# Patient Record
Sex: FEMALE | Race: WHITE | Employment: UNEMPLOYED | RURAL
[De-identification: names, ages, dates, MRNs, and addresses within clinical notes are randomized per-mention and may not be internally consistent; named-entity substitution may affect disease eponyms.]

---

## 2017-01-05 RX ORDER — TRAZODONE HYDROCHLORIDE 100 MG/1
100 TABLET ORAL NIGHTLY
Qty: 30 TABLET | Refills: 3 | Status: SHIPPED | OUTPATIENT
Start: 2017-01-05 | End: 2017-05-15 | Stop reason: SDUPTHER

## 2017-01-05 RX ORDER — DOXEPIN HYDROCHLORIDE 25 MG/1
25 CAPSULE ORAL NIGHTLY
Qty: 30 CAPSULE | Refills: 3 | Status: SHIPPED | OUTPATIENT
Start: 2017-01-05 | End: 2017-03-07 | Stop reason: SDUPTHER

## 2017-01-05 RX ORDER — DIPHENHYDRAMINE HCL 25 MG
CAPSULE ORAL
Qty: 30 CAPSULE | Refills: 0 | Status: SHIPPED | OUTPATIENT
Start: 2017-01-05 | End: 2017-02-15 | Stop reason: SDUPTHER

## 2017-01-05 RX ORDER — ASPIRIN 325 MG
325 TABLET ORAL DAILY
Qty: 30 TABLET | Refills: 11 | Status: SHIPPED | OUTPATIENT
Start: 2017-01-05 | End: 2018-01-24 | Stop reason: SDUPTHER

## 2017-01-05 RX ORDER — CARISOPRODOL 350 MG/1
TABLET ORAL
Qty: 90 TABLET | Refills: 0 | Status: SHIPPED | OUTPATIENT
Start: 2017-01-05 | End: 2017-02-03 | Stop reason: SDUPTHER

## 2017-01-17 ENCOUNTER — OFFICE VISIT (OUTPATIENT)
Dept: PRIMARY CARE CLINIC | Age: 57
End: 2017-01-17
Payer: MEDICAID

## 2017-01-17 VITALS
DIASTOLIC BLOOD PRESSURE: 68 MMHG | SYSTOLIC BLOOD PRESSURE: 104 MMHG | BODY MASS INDEX: 25.88 KG/M2 | HEART RATE: 72 BPM | RESPIRATION RATE: 18 BRPM | OXYGEN SATURATION: 97 % | WEIGHT: 170.2 LBS

## 2017-01-17 DIAGNOSIS — I10 ESSENTIAL HYPERTENSION: Primary | ICD-10-CM

## 2017-01-17 DIAGNOSIS — F41.9 ANXIETY: ICD-10-CM

## 2017-01-17 DIAGNOSIS — E53.8 FOLIC ACID DEFICIENCY: ICD-10-CM

## 2017-01-17 DIAGNOSIS — E78.5 HYPERLIPIDEMIA, UNSPECIFIED HYPERLIPIDEMIA TYPE: ICD-10-CM

## 2017-01-17 PROCEDURE — 99213 OFFICE O/P EST LOW 20 MIN: CPT | Performed by: INTERNAL MEDICINE

## 2017-01-17 RX ORDER — CLONAZEPAM 1 MG/1
1 TABLET ORAL NIGHTLY PRN
Qty: 30 TABLET | Refills: 0 | Status: SHIPPED | OUTPATIENT
Start: 2017-01-17 | End: 2017-02-14 | Stop reason: SDUPTHER

## 2017-01-17 ASSESSMENT — ENCOUNTER SYMPTOMS
SINUS PRESSURE: 0
NAUSEA: 0
VOMITING: 0
ABDOMINAL PAIN: 0
COUGH: 0
BACK PAIN: 0
EYE DISCHARGE: 0
SORE THROAT: 0
SHORTNESS OF BREATH: 0
WHEEZING: 0

## 2017-01-24 RX ORDER — ZOLPIDEM TARTRATE 5 MG/1
TABLET ORAL
Qty: 30 TABLET | Refills: 0 | Status: SHIPPED | OUTPATIENT
Start: 2017-01-24 | End: 2017-02-23 | Stop reason: SDUPTHER

## 2017-01-25 RX ORDER — ROSUVASTATIN CALCIUM 20 MG/1
TABLET, COATED ORAL
Qty: 30 TABLET | Refills: 0 | Status: SHIPPED | OUTPATIENT
Start: 2017-01-25 | End: 2017-03-21 | Stop reason: SDUPTHER

## 2017-01-25 RX ORDER — CLONIDINE HYDROCHLORIDE 0.1 MG/1
TABLET ORAL
Qty: 60 TABLET | Refills: 0 | Status: SHIPPED | OUTPATIENT
Start: 2017-01-25 | End: 2017-03-21 | Stop reason: SDUPTHER

## 2017-01-25 RX ORDER — ATENOLOL 50 MG/1
TABLET ORAL
Qty: 30 TABLET | Refills: 0 | Status: SHIPPED | OUTPATIENT
Start: 2017-01-25 | End: 2017-03-21 | Stop reason: SDUPTHER

## 2017-01-25 RX ORDER — LISINOPRIL 40 MG/1
TABLET ORAL
Qty: 30 TABLET | Refills: 0 | Status: SHIPPED | OUTPATIENT
Start: 2017-01-25 | End: 2017-03-16 | Stop reason: ALTCHOICE

## 2017-01-26 ENCOUNTER — TELEPHONE (OUTPATIENT)
Dept: PRIMARY CARE CLINIC | Age: 57
End: 2017-01-26

## 2017-02-03 RX ORDER — CARISOPRODOL 350 MG/1
350 TABLET ORAL 3 TIMES DAILY PRN
Qty: 90 TABLET | Refills: 0 | Status: SHIPPED | OUTPATIENT
Start: 2017-02-03 | End: 2017-03-16 | Stop reason: ALTCHOICE

## 2017-02-06 ENCOUNTER — HOSPITAL ENCOUNTER (OUTPATIENT)
Dept: OTHER | Age: 57
Discharge: HOME | End: 2017-02-06
Attending: INTERNAL MEDICINE | Admitting: INTERNAL MEDICINE

## 2017-02-06 ENCOUNTER — OFFICE VISIT (OUTPATIENT)
Dept: NEUROLOGY | Facility: CLINIC | Age: 57
End: 2017-02-06

## 2017-02-06 VITALS
HEART RATE: 73 BPM | BODY MASS INDEX: 25.76 KG/M2 | WEIGHT: 170 LBS | HEIGHT: 68 IN | SYSTOLIC BLOOD PRESSURE: 93 MMHG | DIASTOLIC BLOOD PRESSURE: 65 MMHG

## 2017-02-06 DIAGNOSIS — M79.7 FIBROMYALGIA: ICD-10-CM

## 2017-02-06 DIAGNOSIS — I10 ESSENTIAL HYPERTENSION: ICD-10-CM

## 2017-02-06 DIAGNOSIS — M54.2 CHRONIC NECK PAIN: ICD-10-CM

## 2017-02-06 DIAGNOSIS — I77.9 BILATERAL CAROTID ARTERY DISEASE (HCC): ICD-10-CM

## 2017-02-06 DIAGNOSIS — G89.29 CHRONIC NECK PAIN: ICD-10-CM

## 2017-02-06 DIAGNOSIS — F41.9 ANXIETY AND DEPRESSION: ICD-10-CM

## 2017-02-06 DIAGNOSIS — E78.5 HYPERLIPIDEMIA, UNSPECIFIED HYPERLIPIDEMIA TYPE: ICD-10-CM

## 2017-02-06 DIAGNOSIS — F32.A ANXIETY AND DEPRESSION: ICD-10-CM

## 2017-02-06 DIAGNOSIS — F41.9 ANXIETY: ICD-10-CM

## 2017-02-06 DIAGNOSIS — I69.30 SEQUELAE, POST-STROKE: Primary | ICD-10-CM

## 2017-02-06 DIAGNOSIS — F51.04 CHRONIC INSOMNIA: ICD-10-CM

## 2017-02-06 DIAGNOSIS — L29.9 PRURITUS: ICD-10-CM

## 2017-02-06 DIAGNOSIS — M47.812 SPONDYLOSIS OF CERVICAL REGION WITHOUT MYELOPATHY OR RADICULOPATHY: ICD-10-CM

## 2017-02-06 LAB
A/G RATIO: 2 (ref 0.8–2)
ALBUMIN SERPL-MCNC: 4.7 G/DL (ref 3.4–4.8)
ALP BLD-CCNC: 152 U/L (ref 25–100)
ALT SERPL-CCNC: 14 U/L (ref 4–36)
ANION GAP SERPL CALCULATED.3IONS-SCNC: 13 MMOL/L (ref 3–16)
AST SERPL-CCNC: 18 U/L (ref 8–33)
BASOPHILS ABSOLUTE: 0.1 K/UL (ref 0–0.1)
BASOPHILS RELATIVE PERCENT: 0.7 %
BILIRUB SERPL-MCNC: 0.3 MG/DL (ref 0.3–1.2)
BUN BLDV-MCNC: 16 MG/DL (ref 6–20)
CALCIUM SERPL-MCNC: 10.4 MG/DL (ref 8.5–10.5)
CHLORIDE BLD-SCNC: 104 MMOL/L (ref 98–107)
CO2: 25 MMOL/L (ref 20–30)
CREAT SERPL-MCNC: 1.6 MG/DL (ref 0.4–1.2)
EOSINOPHILS ABSOLUTE: 0.1 K/UL (ref 0–0.4)
EOSINOPHILS RELATIVE PERCENT: 1.3 %
GFR AFRICAN AMERICAN: 41
GFR NON-AFRICAN AMERICAN: 33
GLOBULIN: 2.4 G/DL
GLUCOSE BLD-MCNC: 102 MG/DL (ref 74–106)
HCT VFR BLD CALC: 44.3 % (ref 37–47)
HEMOGLOBIN: 14 G/DL (ref 11.5–16.5)
LYMPHOCYTES ABSOLUTE: 2.7 K/UL (ref 1.5–4)
LYMPHOCYTES RELATIVE PERCENT: 31.4 % (ref 20–40)
MCH RBC QN AUTO: 29.6 PG (ref 27–32)
MCHC RBC AUTO-ENTMCNC: 31.6 G/DL (ref 31–35)
MCV RBC AUTO: 93.7 FL (ref 80–100)
MONOCYTES ABSOLUTE: 0.7 K/UL (ref 0.2–0.8)
MONOCYTES RELATIVE PERCENT: 8.1 % (ref 3–10)
NEUTROPHILS ABSOLUTE: 5 K/UL (ref 2–7.5)
NEUTROPHILS RELATIVE PERCENT: 58.5 %
PDW BLD-RTO: 14.4 % (ref 11–16)
PLATELET # BLD: 221 K/UL (ref 150–400)
PMV BLD AUTO: 12 FL (ref 6–10)
POTASSIUM SERPL-SCNC: 4.8 MMOL/L (ref 3.4–5.1)
RBC # BLD: 4.73 M/UL (ref 3.8–5.8)
SODIUM BLD-SCNC: 142 MMOL/L (ref 136–145)
TOTAL PROTEIN: 7.1 G/DL (ref 6.4–8.3)
WBC # BLD: 8.6 K/UL (ref 4–11)

## 2017-02-06 PROCEDURE — 99213 OFFICE O/P EST LOW 20 MIN: CPT | Performed by: PSYCHIATRY & NEUROLOGY

## 2017-02-06 RX ORDER — DOXEPIN HYDROCHLORIDE 50 MG/1
50 CAPSULE ORAL NIGHTLY
Qty: 30 CAPSULE | Refills: 11 | Status: SHIPPED | OUTPATIENT
Start: 2017-02-06 | End: 2020-03-11

## 2017-02-06 NOTE — PROGRESS NOTES
Subjective:     Patient ID: Lexii Duckworth is a 56 y.o. female.    History of Present Illness  The following portions of the patient's history were reviewed and updated as appropriate: allergies, current medications, past family history, past medical history, past social history, past surgical history and problem list.  Complains of poor sleep, feeling down, denies any stroke symptoms, gets Soma, Klonopin from primary office.  Review of Systems   Constitutional: Positive for fatigue. Negative for chills, fever and unexpected weight change.   HENT: Positive for sore throat. Negative for ear pain, hearing loss, nosebleeds and rhinorrhea.    Eyes: Positive for pain and itching. Negative for photophobia, discharge and visual disturbance.   Respiratory: Positive for cough and shortness of breath. Negative for chest tightness and wheezing.    Cardiovascular: Negative for chest pain, palpitations and leg swelling.   Gastrointestinal: Positive for diarrhea, nausea and vomiting. Negative for abdominal pain, blood in stool and constipation.   Genitourinary: Negative for dysuria, frequency, hematuria and urgency.   Musculoskeletal: Positive for gait problem and joint swelling. Negative for arthralgias, back pain, myalgias, neck pain and neck stiffness.        LIMB SWELLING, LIMB PAIN, JOINT STIFFNESS   Skin: Negative for rash and wound.   Allergic/Immunologic: Negative for environmental allergies and food allergies.   Neurological: Positive for dizziness, syncope, weakness, numbness and headaches. Negative for tremors, seizures, speech difficulty and light-headedness.   Hematological: Negative for adenopathy. Bruises/bleeds easily.   Psychiatric/Behavioral: Positive for confusion, dysphoric mood and sleep disturbance. Negative for agitation, decreased concentration, hallucinations and suicidal ideas. The patient is nervous/anxious.         Objective:    Neurologic Exam     Mental Status   Oriented to person, place, and time.        Physical Exam   Constitutional: She is oriented to person, place, and time. She appears well-developed and well-nourished.   Cardiovascular: Normal rate and regular rhythm.    Pulmonary/Chest: Effort normal.   Neurological: She is alert and oriented to person, place, and time. She has normal reflexes.   Psychiatric: She has a normal mood and affect. Her behavior is normal. Thought content normal.       Assessment/Plan:     Lexii was seen today for difficulty walking.    Diagnoses and all orders for this visit:    Sequelae, post-stroke    Bilateral carotid artery disease    Chronic neck pain  -     doxepin (SINEquan) 50 MG capsule; Take 1 capsule by mouth Every Night.    Spondylosis of cervical region without myelopathy or radiculopathy    Fibromyalgia  -     doxepin (SINEquan) 50 MG capsule; Take 1 capsule by mouth Every Night.    Anxiety and depression  -     doxepin (SINEquan) 50 MG capsule; Take 1 capsule by mouth Every Night.    Chronic insomnia  -     doxepin (SINEquan) 50 MG capsule; Take 1 capsule by mouth Every Night.    Anxiety  -     doxepin (SINEquan) 50 MG capsule; Take 1 capsule by mouth Every Night.    Pruritus  -     doxepin (SINEquan) 50 MG capsule; Take 1 capsule by mouth Every Night.

## 2017-02-14 RX ORDER — CLONAZEPAM 1 MG/1
1 TABLET ORAL NIGHTLY PRN
Qty: 30 TABLET | Refills: 0 | Status: SHIPPED | OUTPATIENT
Start: 2017-02-14 | End: 2017-03-13 | Stop reason: SDUPTHER

## 2017-02-15 RX ORDER — DIPHENHYDRAMINE HCL 25 MG
25 CAPSULE ORAL EVERY 6 HOURS PRN
Qty: 30 CAPSULE | Refills: 0 | Status: SHIPPED | OUTPATIENT
Start: 2017-02-15 | End: 2017-03-21 | Stop reason: SDUPTHER

## 2017-02-23 RX ORDER — ZOLPIDEM TARTRATE 5 MG/1
TABLET ORAL
Qty: 30 TABLET | Refills: 0 | Status: SHIPPED | OUTPATIENT
Start: 2017-02-23 | End: 2017-03-23 | Stop reason: SDUPTHER

## 2017-02-28 RX ORDER — ESCITALOPRAM OXALATE 20 MG/1
TABLET ORAL
Qty: 30 TABLET | Refills: 0 | Status: SHIPPED | OUTPATIENT
Start: 2017-02-28 | End: 2017-03-16 | Stop reason: ALTCHOICE

## 2017-02-28 RX ORDER — VENLAFAXINE 37.5 MG/1
TABLET ORAL
Qty: 90 TABLET | Refills: 0 | Status: SHIPPED | OUTPATIENT
Start: 2017-02-28 | End: 2017-04-25 | Stop reason: SDUPTHER

## 2017-03-07 RX ORDER — DOXEPIN HYDROCHLORIDE 25 MG/1
25 CAPSULE ORAL NIGHTLY
Qty: 30 CAPSULE | Refills: 3 | Status: SHIPPED | OUTPATIENT
Start: 2017-03-07 | End: 2017-03-16 | Stop reason: DRUGHIGH

## 2017-03-07 RX ORDER — CARISOPRODOL 350 MG/1
350 TABLET ORAL 3 TIMES DAILY PRN
Qty: 90 TABLET | Refills: 0 | OUTPATIENT
Start: 2017-03-07

## 2017-03-13 RX ORDER — CLONAZEPAM 1 MG/1
1 TABLET ORAL NIGHTLY PRN
Qty: 30 TABLET | Refills: 0 | Status: SHIPPED | OUTPATIENT
Start: 2017-03-13 | End: 2017-04-17 | Stop reason: SDUPTHER

## 2017-03-16 ENCOUNTER — HOSPITAL ENCOUNTER (OUTPATIENT)
Dept: OTHER | Age: 57
Discharge: HOME | End: 2017-03-16
Attending: INTERNAL MEDICINE | Admitting: INTERNAL MEDICINE

## 2017-03-16 ENCOUNTER — OFFICE VISIT (OUTPATIENT)
Dept: PRIMARY CARE CLINIC | Age: 57
End: 2017-03-16
Payer: MEDICAID

## 2017-03-16 VITALS
SYSTOLIC BLOOD PRESSURE: 128 MMHG | WEIGHT: 173 LBS | BODY MASS INDEX: 26.3 KG/M2 | RESPIRATION RATE: 18 BRPM | DIASTOLIC BLOOD PRESSURE: 70 MMHG | OXYGEN SATURATION: 98 % | HEART RATE: 72 BPM

## 2017-03-16 DIAGNOSIS — R79.89 ELEVATED SERUM CREATININE: ICD-10-CM

## 2017-03-16 DIAGNOSIS — F41.9 ANXIETY: ICD-10-CM

## 2017-03-16 DIAGNOSIS — I10 ESSENTIAL HYPERTENSION: ICD-10-CM

## 2017-03-16 DIAGNOSIS — M50.30 DEGENERATIVE DISC DISEASE, CERVICAL: Primary | ICD-10-CM

## 2017-03-16 PROCEDURE — 99214 OFFICE O/P EST MOD 30 MIN: CPT | Performed by: INTERNAL MEDICINE

## 2017-03-16 RX ORDER — HYDROCODONE BITARTRATE AND ACETAMINOPHEN 5; 325 MG/1; MG/1
1 TABLET ORAL 2 TIMES DAILY PRN
Qty: 40 TABLET | Refills: 0 | Status: SHIPPED | OUTPATIENT
Start: 2017-03-16 | End: 2017-04-17 | Stop reason: SDUPTHER

## 2017-03-16 RX ORDER — LISINOPRIL 40 MG/1
40 TABLET ORAL DAILY
COMMUNITY
Start: 2017-02-22 | End: 2017-03-21 | Stop reason: SDUPTHER

## 2017-03-16 RX ORDER — DOXEPIN HYDROCHLORIDE 50 MG/1
50 CAPSULE ORAL NIGHTLY
Qty: 30 CAPSULE | Refills: 0 | Status: SHIPPED | OUTPATIENT
Start: 2017-03-16 | End: 2017-06-20 | Stop reason: HOSPADM

## 2017-03-16 RX ORDER — TIZANIDINE 4 MG/1
4 TABLET ORAL 3 TIMES DAILY
Qty: 45 TABLET | Refills: 1 | Status: SHIPPED | OUTPATIENT
Start: 2017-03-16 | End: 2017-05-15 | Stop reason: SDUPTHER

## 2017-03-16 RX ORDER — DOXEPIN HYDROCHLORIDE 50 MG/1
50 CAPSULE ORAL NIGHTLY
COMMUNITY
Start: 2017-02-06 | End: 2017-03-16 | Stop reason: SDUPTHER

## 2017-03-16 ASSESSMENT — ENCOUNTER SYMPTOMS
VOMITING: 0
WHEEZING: 0
NAUSEA: 0
EYE DISCHARGE: 0
SORE THROAT: 0
ABDOMINAL PAIN: 0
COUGH: 0
SHORTNESS OF BREATH: 0
BACK PAIN: 0
SINUS PRESSURE: 0

## 2017-03-17 LAB
ALBUMIN SERPL-MCNC: 4.5 G/DL (ref 3.4–4.8)
ANION GAP SERPL CALCULATED.3IONS-SCNC: 12 MMOL/L (ref 3–16)
BASOPHILS ABSOLUTE: 0.1 K/UL (ref 0–0.1)
BASOPHILS RELATIVE PERCENT: 0.5 %
BUN BLDV-MCNC: 15 MG/DL (ref 6–20)
CALCIUM SERPL-MCNC: 9.7 MG/DL (ref 8.5–10.5)
CHLORIDE BLD-SCNC: 105 MMOL/L (ref 98–107)
CO2: 25 MMOL/L (ref 20–30)
CREAT SERPL-MCNC: 1.3 MG/DL (ref 0.4–1.2)
EOSINOPHILS ABSOLUTE: 0.1 K/UL (ref 0–0.4)
EOSINOPHILS RELATIVE PERCENT: 1.4 %
GFR AFRICAN AMERICAN: 51
GFR NON-AFRICAN AMERICAN: 43
GLUCOSE BLD-MCNC: 93 MG/DL (ref 74–106)
HCT VFR BLD CALC: 42.9 % (ref 37–47)
HEMOGLOBIN: 14.2 G/DL (ref 11.5–16.5)
LYMPHOCYTES ABSOLUTE: 3.1 K/UL (ref 1.5–4)
LYMPHOCYTES RELATIVE PERCENT: 32.5 % (ref 20–40)
MCH RBC QN AUTO: 30.8 PG (ref 27–32)
MCHC RBC AUTO-ENTMCNC: 33.1 G/DL (ref 31–35)
MCV RBC AUTO: 93.1 FL (ref 80–100)
MONOCYTES ABSOLUTE: 0.7 K/UL (ref 0.2–0.8)
MONOCYTES RELATIVE PERCENT: 7.5 % (ref 3–10)
NEUTROPHILS ABSOLUTE: 5.5 K/UL (ref 2–7.5)
NEUTROPHILS RELATIVE PERCENT: 58.1 %
PDW BLD-RTO: 14.2 % (ref 11–16)
PHOSPHORUS: 4.2 MG/DL (ref 2.5–4.5)
PLATELET # BLD: 238 K/UL (ref 150–400)
PMV BLD AUTO: 12.5 FL (ref 6–10)
POTASSIUM SERPL-SCNC: 4.3 MMOL/L (ref 3.4–5.1)
RBC # BLD: 4.61 M/UL (ref 3.8–5.8)
SODIUM BLD-SCNC: 142 MMOL/L (ref 136–145)
WBC # BLD: 9.5 K/UL (ref 4–11)

## 2017-03-21 RX ORDER — DIPHENHYDRAMINE HCL 25 MG
CAPSULE ORAL
Qty: 30 CAPSULE | Refills: 0 | Status: SHIPPED | OUTPATIENT
Start: 2017-03-21 | End: 2017-04-17 | Stop reason: SDUPTHER

## 2017-03-21 RX ORDER — ROSUVASTATIN CALCIUM 20 MG/1
TABLET, COATED ORAL
Qty: 30 TABLET | Refills: 0 | Status: SHIPPED | OUTPATIENT
Start: 2017-03-21 | End: 2017-05-15 | Stop reason: SDUPTHER

## 2017-03-21 RX ORDER — LISINOPRIL 40 MG/1
TABLET ORAL
Qty: 30 TABLET | Refills: 0 | Status: SHIPPED | OUTPATIENT
Start: 2017-03-21 | End: 2017-05-15 | Stop reason: SDUPTHER

## 2017-03-21 RX ORDER — ATENOLOL 50 MG/1
TABLET ORAL
Qty: 30 TABLET | Refills: 0 | Status: SHIPPED | OUTPATIENT
Start: 2017-03-21 | End: 2017-05-15 | Stop reason: SDUPTHER

## 2017-03-21 RX ORDER — CLONIDINE HYDROCHLORIDE 0.1 MG/1
TABLET ORAL
Qty: 60 TABLET | Refills: 0 | Status: SHIPPED | OUTPATIENT
Start: 2017-03-21 | End: 2017-05-15 | Stop reason: SDUPTHER

## 2017-03-23 ENCOUNTER — TELEPHONE (OUTPATIENT)
Dept: PRIMARY CARE CLINIC | Age: 57
End: 2017-03-23

## 2017-03-23 RX ORDER — ZOLPIDEM TARTRATE 5 MG/1
TABLET ORAL
Qty: 30 TABLET | Refills: 0 | Status: SHIPPED | OUTPATIENT
Start: 2017-03-23 | End: 2017-05-08 | Stop reason: SDUPTHER

## 2017-03-23 RX ORDER — AMOXICILLIN AND CLAVULANATE POTASSIUM 875; 125 MG/1; MG/1
1 TABLET, FILM COATED ORAL 2 TIMES DAILY
Qty: 14 TABLET | Refills: 0 | Status: SHIPPED | OUTPATIENT
Start: 2017-03-23 | End: 2017-03-30

## 2017-03-24 RX ORDER — ZOLPIDEM TARTRATE 5 MG/1
TABLET ORAL
Qty: 30 TABLET | Refills: 0 | Status: SHIPPED | OUTPATIENT
Start: 2017-03-24 | End: 2017-05-08 | Stop reason: SDUPTHER

## 2017-03-29 ENCOUNTER — HOSPITAL ENCOUNTER (OUTPATIENT)
Dept: PHYSICAL THERAPY | Age: 57
Discharge: HOME | End: 2017-03-31
Attending: INTERNAL MEDICINE | Admitting: INTERNAL MEDICINE

## 2017-03-29 ASSESSMENT — PAIN DESCRIPTION - ORIENTATION: ORIENTATION: RIGHT

## 2017-03-29 ASSESSMENT — PAIN SCALES - GENERAL: PAINLEVEL_OUTOF10: 3

## 2017-03-29 ASSESSMENT — PAIN DESCRIPTION - LOCATION: LOCATION: NECK

## 2017-04-17 RX ORDER — DIPHENHYDRAMINE HCL 25 MG
CAPSULE ORAL
Qty: 30 CAPSULE | Refills: 0 | Status: SHIPPED | OUTPATIENT
Start: 2017-04-17 | End: 2017-06-20 | Stop reason: HOSPADM

## 2017-04-17 RX ORDER — CLONAZEPAM 1 MG/1
1 TABLET ORAL NIGHTLY PRN
Qty: 30 TABLET | Refills: 0 | Status: SHIPPED | OUTPATIENT
Start: 2017-04-17 | End: 2017-06-20 | Stop reason: HOSPADM

## 2017-04-17 RX ORDER — HYDROCODONE BITARTRATE AND ACETAMINOPHEN 5; 325 MG/1; MG/1
1 TABLET ORAL 2 TIMES DAILY PRN
Qty: 40 TABLET | Refills: 0 | Status: SHIPPED | OUTPATIENT
Start: 2017-04-17 | End: 2017-05-19 | Stop reason: ALTCHOICE

## 2017-05-08 RX ORDER — ZOLPIDEM TARTRATE 5 MG/1
5 TABLET ORAL NIGHTLY PRN
Qty: 30 TABLET | Refills: 0 | Status: SHIPPED | OUTPATIENT
Start: 2017-05-08 | End: 2017-06-20 | Stop reason: HOSPADM

## 2017-06-13 ENCOUNTER — TELEPHONE (OUTPATIENT)
Dept: PRIMARY CARE CLINIC | Age: 57
End: 2017-06-13

## 2017-06-20 ENCOUNTER — HOSPITAL ENCOUNTER (OUTPATIENT)
Dept: OTHER | Age: 57
Discharge: HOME | End: 2017-06-20
Attending: INTERNAL MEDICINE | Admitting: INTERNAL MEDICINE

## 2017-06-20 ENCOUNTER — TELEPHONE (OUTPATIENT)
Dept: PRIMARY CARE CLINIC | Age: 57
End: 2017-06-20

## 2017-06-20 ENCOUNTER — OFFICE VISIT (OUTPATIENT)
Dept: PRIMARY CARE CLINIC | Age: 57
End: 2017-06-20
Payer: MEDICAID

## 2017-06-20 VITALS
DIASTOLIC BLOOD PRESSURE: 62 MMHG | RESPIRATION RATE: 18 BRPM | BODY MASS INDEX: 24.75 KG/M2 | OXYGEN SATURATION: 98 % | WEIGHT: 162.8 LBS | HEART RATE: 119 BPM | SYSTOLIC BLOOD PRESSURE: 112 MMHG

## 2017-06-20 DIAGNOSIS — R32 URINARY INCONTINENCE, UNSPECIFIED TYPE: ICD-10-CM

## 2017-06-20 DIAGNOSIS — S82.832D CLOSED FRACTURE OF DISTAL END OF LEFT FIBULA WITH ROUTINE HEALING, UNSPECIFIED FRACTURE MORPHOLOGY, SUBSEQUENT ENCOUNTER: ICD-10-CM

## 2017-06-20 DIAGNOSIS — N17.9 ACUTE RENAL FAILURE, UNSPECIFIED ACUTE RENAL FAILURE TYPE (HCC): ICD-10-CM

## 2017-06-20 DIAGNOSIS — N17.9 ACUTE RENAL FAILURE, UNSPECIFIED ACUTE RENAL FAILURE TYPE (HCC): Primary | ICD-10-CM

## 2017-06-20 DIAGNOSIS — E87.5 HYPERKALEMIA: Primary | ICD-10-CM

## 2017-06-20 LAB
A/G RATIO: 1.2 (ref 0.8–2)
ALBUMIN SERPL-MCNC: 3.7 G/DL (ref 3.4–4.8)
ALP BLD-CCNC: 219 U/L (ref 25–100)
ALT SERPL-CCNC: 13 U/L (ref 4–36)
ANION GAP SERPL CALCULATED.3IONS-SCNC: 13 MMOL/L (ref 3–16)
AST SERPL-CCNC: 14 U/L (ref 8–33)
BASOPHILS ABSOLUTE: 0.1 K/UL (ref 0–0.1)
BASOPHILS RELATIVE PERCENT: 0.8 %
BILIRUB SERPL-MCNC: <0.2 MG/DL (ref 0.3–1.2)
BILIRUBIN, POC: ABNORMAL
BLOOD URINE, POC: ABNORMAL
BUN BLDV-MCNC: 11 MG/DL (ref 6–20)
CALCIUM SERPL-MCNC: 9.6 MG/DL (ref 8.5–10.5)
CHLORIDE BLD-SCNC: 104 MMOL/L (ref 98–107)
CLARITY, POC: CLEAR
CO2: 23 MMOL/L (ref 20–30)
COLOR, POC: ABNORMAL
CREAT SERPL-MCNC: 1.3 MG/DL (ref 0.4–1.2)
EOSINOPHILS ABSOLUTE: 0.2 K/UL (ref 0–0.4)
EOSINOPHILS RELATIVE PERCENT: 1.9 %
GFR AFRICAN AMERICAN: 51
GFR NON-AFRICAN AMERICAN: 42
GLOBULIN: 3.1 G/DL
GLUCOSE BLD-MCNC: 85 MG/DL (ref 74–106)
GLUCOSE URINE, POC: ABNORMAL
HCT VFR BLD CALC: 34.5 % (ref 37–47)
HEMOGLOBIN: 10.2 G/DL (ref 11.5–16.5)
KETONES, POC: ABNORMAL
LEUKOCYTE EST, POC: ABNORMAL
LYMPHOCYTES ABSOLUTE: 2.4 K/UL (ref 1.5–4)
LYMPHOCYTES RELATIVE PERCENT: 20 % (ref 20–40)
MCH RBC QN AUTO: 28.3 PG (ref 27–32)
MCHC RBC AUTO-ENTMCNC: 29.6 G/DL (ref 31–35)
MCV RBC AUTO: 95.8 FL (ref 80–100)
MONOCYTES ABSOLUTE: 0.8 K/UL (ref 0.2–0.8)
MONOCYTES RELATIVE PERCENT: 6.4 % (ref 3–10)
NEUTROPHILS ABSOLUTE: 8.4 K/UL (ref 2–7.5)
NEUTROPHILS RELATIVE PERCENT: 70.9 %
NITRITE, POC: ABNORMAL
PDW BLD-RTO: 15.2 % (ref 11–16)
PH, POC: 6
PLATELET # BLD: 457 K/UL (ref 150–400)
PMV BLD AUTO: 10.4 FL (ref 6–10)
POTASSIUM SERPL-SCNC: 5.8 MMOL/L (ref 3.4–5.1)
PROTEIN, POC: ABNORMAL
RBC # BLD: 3.6 M/UL (ref 3.8–5.8)
SODIUM BLD-SCNC: 140 MMOL/L (ref 136–145)
SPECIFIC GRAVITY, POC: 1.01
TOTAL PROTEIN: 6.8 G/DL (ref 6.4–8.3)
UROBILINOGEN, POC: 0.2
WBC # BLD: 11.8 K/UL (ref 4–11)

## 2017-06-20 PROCEDURE — 99213 OFFICE O/P EST LOW 20 MIN: CPT | Performed by: INTERNAL MEDICINE

## 2017-06-20 PROCEDURE — 81002 URINALYSIS NONAUTO W/O SCOPE: CPT | Performed by: INTERNAL MEDICINE

## 2017-06-20 RX ORDER — TRAZODONE HYDROCHLORIDE 50 MG/1
1 TABLET ORAL NIGHTLY
COMMUNITY
Start: 2017-06-13 | End: 2017-07-10 | Stop reason: SDUPTHER

## 2017-06-20 RX ORDER — GABAPENTIN 100 MG/1
100 CAPSULE ORAL 3 TIMES DAILY
COMMUNITY
Start: 2017-06-13 | End: 2017-07-11 | Stop reason: SDUPTHER

## 2017-06-20 RX ORDER — PANTOPRAZOLE SODIUM 40 MG/1
1 TABLET, DELAYED RELEASE ORAL
COMMUNITY
Start: 2017-06-13 | End: 2017-07-10 | Stop reason: SDUPTHER

## 2017-06-20 RX ORDER — VENLAFAXINE HYDROCHLORIDE 75 MG/1
75 CAPSULE, EXTENDED RELEASE ORAL NIGHTLY
COMMUNITY
Start: 2017-06-13 | End: 2017-07-10 | Stop reason: SDUPTHER

## 2017-06-20 RX ORDER — CEFDINIR 300 MG/1
300 CAPSULE ORAL 2 TIMES DAILY
Qty: 14 CAPSULE | Refills: 0 | Status: SHIPPED | OUTPATIENT
Start: 2017-06-20 | End: 2017-06-27

## 2017-06-20 RX ORDER — HYDROCODONE BITARTRATE AND ACETAMINOPHEN 5; 325 MG/1; MG/1
1 TABLET ORAL 2 TIMES DAILY
COMMUNITY
Start: 2017-06-13 | End: 2017-07-11 | Stop reason: SDUPTHER

## 2017-06-20 RX ORDER — FLUDROCORTISONE ACETATE 0.1 MG/1
1 TABLET ORAL DAILY
COMMUNITY
Start: 2017-06-13 | End: 2017-07-10 | Stop reason: SDUPTHER

## 2017-06-20 RX ORDER — POTASSIUM CHLORIDE 750 MG/1
4 TABLET, FILM COATED, EXTENDED RELEASE ORAL DAILY
COMMUNITY
Start: 2017-06-13 | End: 2017-06-20

## 2017-06-20 RX ORDER — FOLIC ACID 1 MG/1
1 TABLET ORAL DAILY
COMMUNITY
Start: 2017-06-13 | End: 2017-07-10 | Stop reason: SDUPTHER

## 2017-06-20 RX ORDER — POLYETHYLENE GLYCOL 3350 17 G/17G
POWDER, FOR SOLUTION ORAL DAILY PRN
COMMUNITY
Start: 2017-06-13 | End: 2017-07-10 | Stop reason: SDUPTHER

## 2017-06-20 ASSESSMENT — ENCOUNTER SYMPTOMS
SHORTNESS OF BREATH: 0
SORE THROAT: 0
NAUSEA: 0
VOMITING: 0
SINUS PRESSURE: 0
ABDOMINAL PAIN: 0
EYE DISCHARGE: 0
WHEEZING: 0
COUGH: 0

## 2017-06-20 ASSESSMENT — PATIENT HEALTH QUESTIONNAIRE - PHQ9
SUM OF ALL RESPONSES TO PHQ9 QUESTIONS 1 & 2: 6
SUM OF ALL RESPONSES TO PHQ QUESTIONS 1-9: 6
2. FEELING DOWN, DEPRESSED OR HOPELESS: 3
1. LITTLE INTEREST OR PLEASURE IN DOING THINGS: 3

## 2017-06-26 ENCOUNTER — HOSPITAL ENCOUNTER (OUTPATIENT)
Dept: OTHER | Age: 57
Discharge: HOME | End: 2017-06-26
Attending: INTERNAL MEDICINE | Admitting: INTERNAL MEDICINE

## 2017-06-26 DIAGNOSIS — E87.5 HYPERKALEMIA: ICD-10-CM

## 2017-06-26 LAB
ANION GAP SERPL CALCULATED.3IONS-SCNC: 14 MMOL/L (ref 3–16)
BUN BLDV-MCNC: 10 MG/DL (ref 6–20)
CALCIUM SERPL-MCNC: 9.8 MG/DL (ref 8.5–10.5)
CHLORIDE BLD-SCNC: 106 MMOL/L (ref 98–107)
CO2: 26 MMOL/L (ref 20–30)
CREAT SERPL-MCNC: 1.4 MG/DL (ref 0.4–1.2)
GFR AFRICAN AMERICAN: 47
GFR NON-AFRICAN AMERICAN: 39
GLUCOSE BLD-MCNC: 138 MG/DL (ref 74–106)
POTASSIUM SERPL-SCNC: 4.3 MMOL/L (ref 3.4–5.1)
SODIUM BLD-SCNC: 146 MMOL/L (ref 136–145)

## 2017-07-08 ASSESSMENT — ENCOUNTER SYMPTOMS: BACK PAIN: 1

## 2017-07-10 ENCOUNTER — PATIENT MESSAGE (OUTPATIENT)
Dept: PRIMARY CARE CLINIC | Age: 57
End: 2017-07-10

## 2017-07-10 RX ORDER — PANTOPRAZOLE SODIUM 40 MG/1
40 TABLET, DELAYED RELEASE ORAL
Qty: 30 TABLET | Refills: 3 | Status: SHIPPED | OUTPATIENT
Start: 2017-07-10 | End: 2017-09-18

## 2017-07-10 RX ORDER — FLUDROCORTISONE ACETATE 0.1 MG/1
0.1 TABLET ORAL DAILY
Qty: 30 TABLET | Refills: 3 | Status: SHIPPED | OUTPATIENT
Start: 2017-07-10 | End: 2017-09-18

## 2017-07-10 RX ORDER — FOLIC ACID 1 MG/1
1000 TABLET ORAL DAILY
Qty: 30 TABLET | Refills: 3 | Status: SHIPPED | OUTPATIENT
Start: 2017-07-10 | End: 2017-11-01 | Stop reason: SDUPTHER

## 2017-07-10 RX ORDER — POLYETHYLENE GLYCOL 3350 17 G/17G
17 POWDER, FOR SOLUTION ORAL DAILY PRN
Qty: 1 BOTTLE | Refills: 1 | Status: SHIPPED | OUTPATIENT
Start: 2017-07-10 | End: 2019-03-05

## 2017-07-10 RX ORDER — VENLAFAXINE HYDROCHLORIDE 75 MG/1
75 CAPSULE, EXTENDED RELEASE ORAL NIGHTLY
Qty: 30 CAPSULE | Refills: 3 | Status: SHIPPED | OUTPATIENT
Start: 2017-07-10 | End: 2017-09-18

## 2017-07-10 RX ORDER — TRAZODONE HYDROCHLORIDE 50 MG/1
50 TABLET ORAL NIGHTLY
Qty: 30 TABLET | Refills: 3 | Status: SHIPPED | OUTPATIENT
Start: 2017-07-10 | End: 2017-10-09

## 2017-07-11 RX ORDER — GABAPENTIN 100 MG/1
100 CAPSULE ORAL 3 TIMES DAILY
Qty: 90 CAPSULE | Refills: 0 | Status: SHIPPED | OUTPATIENT
Start: 2017-07-11 | End: 2017-08-08 | Stop reason: SDUPTHER

## 2017-07-11 RX ORDER — HYDROCODONE BITARTRATE AND ACETAMINOPHEN 5; 325 MG/1; MG/1
1 TABLET ORAL 2 TIMES DAILY PRN
Qty: 45 TABLET | Refills: 0 | Status: SHIPPED | OUTPATIENT
Start: 2017-07-11 | End: 2017-08-10 | Stop reason: SDUPTHER

## 2017-07-20 ENCOUNTER — OFFICE VISIT (OUTPATIENT)
Dept: PRIMARY CARE CLINIC | Age: 57
End: 2017-07-20
Payer: MEDICAID

## 2017-07-20 VITALS
DIASTOLIC BLOOD PRESSURE: 74 MMHG | SYSTOLIC BLOOD PRESSURE: 136 MMHG | RESPIRATION RATE: 18 BRPM | WEIGHT: 169.6 LBS | HEART RATE: 95 BPM | BODY MASS INDEX: 25.79 KG/M2 | OXYGEN SATURATION: 95 %

## 2017-07-20 DIAGNOSIS — E87.5 HYPERKALEMIA: ICD-10-CM

## 2017-07-20 DIAGNOSIS — N18.30 CRF (CHRONIC RENAL FAILURE), STAGE 3 (MODERATE) (HCC): ICD-10-CM

## 2017-07-20 DIAGNOSIS — D64.9 ANEMIA, UNSPECIFIED TYPE: Primary | ICD-10-CM

## 2017-07-20 DIAGNOSIS — I10 ESSENTIAL HYPERTENSION: ICD-10-CM

## 2017-07-20 PROCEDURE — 99213 OFFICE O/P EST LOW 20 MIN: CPT | Performed by: INTERNAL MEDICINE

## 2017-07-20 RX ORDER — FUROSEMIDE 20 MG/1
20 TABLET ORAL DAILY PRN
Qty: 30 TABLET | Refills: 1 | Status: SHIPPED | OUTPATIENT
Start: 2017-07-20 | End: 2017-09-18

## 2017-07-20 ASSESSMENT — ENCOUNTER SYMPTOMS
NAUSEA: 0
ABDOMINAL PAIN: 0
EYE DISCHARGE: 0
SINUS PRESSURE: 0
VOMITING: 0
WHEEZING: 0
SORE THROAT: 0
SHORTNESS OF BREATH: 0
COUGH: 0

## 2017-07-22 ASSESSMENT — ENCOUNTER SYMPTOMS: BACK PAIN: 1

## 2017-08-08 RX ORDER — GABAPENTIN 100 MG/1
CAPSULE ORAL
Qty: 90 CAPSULE | Refills: 0 | Status: SHIPPED | OUTPATIENT
Start: 2017-08-08 | End: 2017-09-12 | Stop reason: SDUPTHER

## 2017-08-08 RX ORDER — POTASSIUM CHLORIDE 750 MG/1
TABLET, FILM COATED, EXTENDED RELEASE ORAL
Qty: 120 TABLET | Refills: 3 | Status: SHIPPED | OUTPATIENT
Start: 2017-08-08 | End: 2017-08-15

## 2017-08-08 RX ORDER — LISINOPRIL 40 MG/1
TABLET ORAL
Qty: 30 TABLET | Refills: 3 | Status: SHIPPED | OUTPATIENT
Start: 2017-08-08 | End: 2017-09-18

## 2017-08-08 RX ORDER — ROSUVASTATIN CALCIUM 20 MG/1
TABLET, COATED ORAL
Qty: 30 TABLET | Refills: 3 | Status: SHIPPED | OUTPATIENT
Start: 2017-08-08 | End: 2017-12-27 | Stop reason: SDUPTHER

## 2017-08-10 RX ORDER — HYDROCODONE BITARTRATE AND ACETAMINOPHEN 5; 325 MG/1; MG/1
1 TABLET ORAL 2 TIMES DAILY PRN
Qty: 45 TABLET | Refills: 0 | Status: SHIPPED | OUTPATIENT
Start: 2017-08-10 | End: 2017-09-12 | Stop reason: SDUPTHER

## 2017-09-12 RX ORDER — GABAPENTIN 100 MG/1
CAPSULE ORAL
Qty: 90 CAPSULE | Refills: 0 | Status: SHIPPED | OUTPATIENT
Start: 2017-09-12 | End: 2017-10-09 | Stop reason: SDUPTHER

## 2017-09-12 RX ORDER — HYDROCODONE BITARTRATE AND ACETAMINOPHEN 5; 325 MG/1; MG/1
1 TABLET ORAL 2 TIMES DAILY PRN
Qty: 45 TABLET | Refills: 0 | Status: SHIPPED | OUTPATIENT
Start: 2017-09-12 | End: 2017-10-09 | Stop reason: SDUPTHER

## 2017-09-18 ENCOUNTER — OFFICE VISIT (OUTPATIENT)
Dept: PRIMARY CARE CLINIC | Age: 57
End: 2017-09-18
Payer: MEDICAID

## 2017-09-18 VITALS
DIASTOLIC BLOOD PRESSURE: 92 MMHG | OXYGEN SATURATION: 96 % | BODY MASS INDEX: 23.66 KG/M2 | HEART RATE: 114 BPM | RESPIRATION RATE: 18 BRPM | WEIGHT: 155.6 LBS | SYSTOLIC BLOOD PRESSURE: 138 MMHG

## 2017-09-18 DIAGNOSIS — I10 ESSENTIAL HYPERTENSION: Primary | ICD-10-CM

## 2017-09-18 DIAGNOSIS — Z87.448 HISTORY OF PYELONEPHRITIS: ICD-10-CM

## 2017-09-18 DIAGNOSIS — F41.9 ANXIETY: ICD-10-CM

## 2017-09-18 DIAGNOSIS — N13.30 HYDRONEPHROSIS, RIGHT: ICD-10-CM

## 2017-09-18 DIAGNOSIS — Z86.19 HISTORY OF SEPTIC SHOCK: ICD-10-CM

## 2017-09-18 DIAGNOSIS — R31.9 HEMATURIA: ICD-10-CM

## 2017-09-18 LAB
BILIRUBIN, POC: ABNORMAL
BLOOD URINE, POC: ABNORMAL
CLARITY, POC: CLEAR
COLOR, POC: YELLOW
GLUCOSE URINE, POC: ABNORMAL
KETONES, POC: ABNORMAL
LEUKOCYTE EST, POC: ABNORMAL
NITRITE, POC: ABNORMAL
PH, POC: 6.5
PROTEIN, POC: ABNORMAL
SPECIFIC GRAVITY, POC: 1
UROBILINOGEN, POC: 0.2

## 2017-09-18 PROCEDURE — 81002 URINALYSIS NONAUTO W/O SCOPE: CPT | Performed by: INTERNAL MEDICINE

## 2017-09-18 PROCEDURE — 99214 OFFICE O/P EST MOD 30 MIN: CPT | Performed by: INTERNAL MEDICINE

## 2017-09-18 RX ORDER — CLONAZEPAM 0.5 MG/1
0.5 TABLET ORAL 2 TIMES DAILY
Qty: 60 TABLET | Refills: 0 | Status: SHIPPED | OUTPATIENT
Start: 2017-09-18 | End: 2017-10-17 | Stop reason: SDUPTHER

## 2017-09-18 RX ORDER — LISINOPRIL 10 MG/1
10 TABLET ORAL DAILY
Qty: 30 TABLET | Refills: 2 | Status: SHIPPED | OUTPATIENT
Start: 2017-09-18 | End: 2017-11-20 | Stop reason: SDUPTHER

## 2017-09-18 ASSESSMENT — ENCOUNTER SYMPTOMS
BACK PAIN: 0
NAUSEA: 0
VOMITING: 0
EYE DISCHARGE: 0
COUGH: 0
SORE THROAT: 0
ABDOMINAL PAIN: 0
SHORTNESS OF BREATH: 0
WHEEZING: 0
SINUS PRESSURE: 0

## 2017-09-26 ENCOUNTER — HOSPITAL ENCOUNTER (OUTPATIENT)
Dept: OTHER | Age: 57
Discharge: HOME | End: 2017-09-26
Attending: INTERNAL MEDICINE | Admitting: INTERNAL MEDICINE

## 2017-09-26 DIAGNOSIS — R31.9 HEMATURIA: ICD-10-CM

## 2017-09-26 DIAGNOSIS — N13.30 HYDRONEPHROSIS, RIGHT: ICD-10-CM

## 2017-09-26 DIAGNOSIS — Z87.448 HISTORY OF PYELONEPHRITIS: ICD-10-CM

## 2017-09-26 LAB
A/G RATIO: 1.5 (ref 0.8–2)
ALBUMIN SERPL-MCNC: 4 G/DL (ref 3.4–4.8)
ALP BLD-CCNC: 171 U/L (ref 25–100)
ALT SERPL-CCNC: 28 U/L (ref 4–36)
ANION GAP SERPL CALCULATED.3IONS-SCNC: 12 MMOL/L (ref 3–16)
AST SERPL-CCNC: 20 U/L (ref 8–33)
BASOPHILS ABSOLUTE: 0.1 K/UL (ref 0–0.1)
BASOPHILS RELATIVE PERCENT: 0.7 %
BILIRUB SERPL-MCNC: <0.2 MG/DL (ref 0.3–1.2)
BUN BLDV-MCNC: 10 MG/DL (ref 6–20)
CALCIUM SERPL-MCNC: 9.8 MG/DL (ref 8.5–10.5)
CHLORIDE BLD-SCNC: 108 MMOL/L (ref 98–107)
CO2: 23 MMOL/L (ref 20–30)
CREAT SERPL-MCNC: 1.2 MG/DL (ref 0.4–1.2)
EOSINOPHILS ABSOLUTE: 0.1 K/UL (ref 0–0.4)
EOSINOPHILS RELATIVE PERCENT: 1.4 %
GFR AFRICAN AMERICAN: 56
GFR NON-AFRICAN AMERICAN: 47
GLOBULIN: 2.6 G/DL
GLUCOSE BLD-MCNC: 108 MG/DL (ref 74–106)
HCT VFR BLD CALC: 39 % (ref 37–47)
HEMOGLOBIN: 12.2 G/DL (ref 11.5–16.5)
LYMPHOCYTES ABSOLUTE: 2 K/UL (ref 1.5–4)
LYMPHOCYTES RELATIVE PERCENT: 23.1 % (ref 20–40)
MCH RBC QN AUTO: 27.7 PG (ref 27–32)
MCHC RBC AUTO-ENTMCNC: 31.3 G/DL (ref 31–35)
MCV RBC AUTO: 88.6 FL (ref 80–100)
MONOCYTES ABSOLUTE: 0.5 K/UL (ref 0.2–0.8)
MONOCYTES RELATIVE PERCENT: 6 % (ref 3–10)
NEUTROPHILS ABSOLUTE: 6 K/UL (ref 2–7.5)
NEUTROPHILS RELATIVE PERCENT: 68.8 %
PDW BLD-RTO: 14.7 % (ref 11–16)
PLATELET # BLD: 275 K/UL (ref 150–400)
PMV BLD AUTO: 11.2 FL (ref 6–10)
POTASSIUM SERPL-SCNC: 4.4 MMOL/L (ref 3.4–5.1)
RBC # BLD: 4.4 M/UL (ref 3.8–5.8)
SODIUM BLD-SCNC: 143 MMOL/L (ref 136–145)
TOTAL PROTEIN: 6.6 G/DL (ref 6.4–8.3)
WBC # BLD: 8.7 K/UL (ref 4–11)

## 2017-10-09 ENCOUNTER — OFFICE VISIT (OUTPATIENT)
Dept: PRIMARY CARE CLINIC | Age: 57
End: 2017-10-09
Payer: MEDICAID

## 2017-10-09 VITALS
SYSTOLIC BLOOD PRESSURE: 118 MMHG | WEIGHT: 152 LBS | HEART RATE: 92 BPM | RESPIRATION RATE: 18 BRPM | OXYGEN SATURATION: 98 % | BODY MASS INDEX: 23.11 KG/M2 | DIASTOLIC BLOOD PRESSURE: 74 MMHG

## 2017-10-09 DIAGNOSIS — R55 SYNCOPE, UNSPECIFIED SYNCOPE TYPE: Primary | ICD-10-CM

## 2017-10-09 DIAGNOSIS — Z86.73 HISTORY OF CVA (CEREBROVASCULAR ACCIDENT): ICD-10-CM

## 2017-10-09 DIAGNOSIS — I77.9 CAROTID DISEASE, BILATERAL (HCC): ICD-10-CM

## 2017-10-09 PROCEDURE — 99214 OFFICE O/P EST MOD 30 MIN: CPT | Performed by: INTERNAL MEDICINE

## 2017-10-09 RX ORDER — GABAPENTIN 100 MG/1
CAPSULE ORAL
Qty: 90 CAPSULE | Refills: 0 | Status: SHIPPED | OUTPATIENT
Start: 2017-10-09 | End: 2017-11-07 | Stop reason: SDUPTHER

## 2017-10-09 RX ORDER — VENLAFAXINE HYDROCHLORIDE 75 MG/1
1 CAPSULE, EXTENDED RELEASE ORAL NIGHTLY
COMMUNITY
Start: 2017-10-03 | End: 2017-11-20 | Stop reason: SDUPTHER

## 2017-10-09 RX ORDER — PANTOPRAZOLE SODIUM 40 MG/1
1 TABLET, DELAYED RELEASE ORAL 2 TIMES DAILY
COMMUNITY
Start: 2017-10-03 | End: 2017-11-20 | Stop reason: SDUPTHER

## 2017-10-09 RX ORDER — HYDROCODONE BITARTRATE AND ACETAMINOPHEN 5; 325 MG/1; MG/1
1 TABLET ORAL 2 TIMES DAILY PRN
Qty: 45 TABLET | Refills: 0 | Status: SHIPPED | OUTPATIENT
Start: 2017-10-09 | End: 2017-11-07 | Stop reason: SDUPTHER

## 2017-10-09 NOTE — PROGRESS NOTES
Have you seen any other physician or provider since your last visit no    Have you had any other diagnostic tests since your last visit? Yes labs , xray abdomen 09/26/17    Have you changed or stopped any medications since your last visit? no           Pt is here co dizziness and she passed about about a week ago. Pt states she continues to be dizzy it comes and goes. Pt states she was nauseated as well. Pt states she only had the one spell when she passed out.

## 2017-10-09 NOTE — PROGRESS NOTES
rosuvastatin (CRESTOR) 20 MG tablet TAKE 1 TABLET BY MOUTH DAILY 30 tablet 3    folic acid (FOLVITE) 1 MG tablet Take 1 tablet by mouth daily 30 tablet 3    polyethylene glycol (GLYCOLAX) powder Take 17 g by mouth daily as needed (constipation) 1 Bottle 1    traZODone (DESYREL) 50 MG tablet Take 1 tablet by mouth nightly 30 tablet 3    aspirin (MIRIAM ASPIRIN) 325 MG tablet Take 1 tablet by mouth daily 30 tablet 11        Review of Systems   Constitutional: Negative for chills and fever. HENT: Negative for congestion, sinus pressure and sore throat. Eyes: Negative for discharge and visual disturbance. Respiratory: Negative for cough, shortness of breath and wheezing. Cardiovascular: Negative for chest pain and palpitations. Gastrointestinal: Negative for abdominal pain, nausea and vomiting. Endocrine: Negative for cold intolerance and heat intolerance. Genitourinary: Negative for dysuria, frequency and urgency. Musculoskeletal: Positive for arthralgias and back pain. Skin: Negative for rash and wound. Neurological: Positive for dizziness and syncope (hx ). Negative for numbness and headaches. Hematological: Negative. Psychiatric/Behavioral: Positive for sleep disturbance. Negative for agitation, confusion, self-injury and suicidal ideas. The patient is nervous/anxious. OBJECTIVE:   Wt Readings from Last 3 Encounters:   10/09/17 152 lb (68.9 kg)   09/18/17 155 lb 9.6 oz (70.6 kg)   08/15/17 169 lb (76.7 kg)     BP Readings from Last 3 Encounters:   10/09/17 118/74   09/18/17 (!) 138/92   08/15/17 (!) 155/78       /74 (Site: Left Arm, Position: Sitting, Cuff Size: Medium Adult)   Pulse 92   Resp 18   Wt 152 lb (68.9 kg)   LMP 01/01/2011   SpO2 98%   BMI 23.11 kg/m²      Physical Exam   Constitutional: She is oriented to person, place, and time. She appears well-developed and well-nourished. HENT:   Head: Normocephalic and atraumatic.    Right Ear: External ear normal. Left Ear: External ear normal.   Nose: Nose normal.   Eyes: Conjunctivae and EOM are normal. Pupils are equal, round, and reactive to light. Neck: Normal range of motion. Neck supple. No JVD present. No thyromegaly present. Cardiovascular: Normal rate, regular rhythm and normal heart sounds. Pulmonary/Chest: Effort normal and breath sounds normal. She has no wheezes. She has no rales. Abdominal: Soft. Bowel sounds are normal. She exhibits no distension. There is no tenderness. Musculoskeletal: She exhibits no edema or tenderness. Neurological: She is alert and oriented to person, place, and time. No cranial nerve deficit. Skin: No rash noted. No erythema. Psychiatric: She has a normal mood and affect. Judgment normal.   Nursing note and vitals reviewed. Lab Results   Component Value Date     09/26/2017    K 4.4 09/26/2017     09/26/2017    .0 05/22/2012    CO2 23 09/26/2017    GLUCOSE 108 09/26/2017    BUN 10 09/26/2017    CREATININE 1.2 09/26/2017    CREATININE 0.8 05/22/2012    CALCIUM 9.8 09/26/2017    PROT 6.6 09/26/2017    LABALBU 4.0 09/26/2017    LABALBU 4.3 05/22/2012    BILITOT <0.2 09/26/2017    ALT 28 09/26/2017    AST 20 09/26/2017       Hemoglobin A1C (%)   Date Value   11/09/2015 5.4     Microscopic Examination (no units)   Date Value   08/15/2017 YES     LDL Calculated (mg/dL)   Date Value   04/20/2016 72         Lab Results   Component Value Date    WBC 8.7 09/26/2017    NEUTROABS 6.0 09/26/2017    HGB 12.2 09/26/2017    HCT 39.0 09/26/2017    MCV 88.6 09/26/2017     09/26/2017       Lab Results   Component Value Date    TSH 1.14 09/14/2016         ASSESSMENT/PLAN:     1. Syncope, unspecified syncope type  Proceed with checking blood work. Check CT scan of the brain in addition to carotid duplex for further evaluation.  I had long conversation with the patient and her daughter regarding the importance of seeking medical attention with any new neurologic

## 2017-10-16 ENCOUNTER — TELEPHONE (OUTPATIENT)
Dept: PRIMARY CARE CLINIC | Age: 57
End: 2017-10-16

## 2017-10-17 ENCOUNTER — PATIENT MESSAGE (OUTPATIENT)
Dept: PRIMARY CARE CLINIC | Age: 57
End: 2017-10-17

## 2017-10-17 RX ORDER — CLONAZEPAM 0.5 MG/1
0.5 TABLET ORAL 2 TIMES DAILY
Qty: 60 TABLET | Refills: 0 | Status: SHIPPED | OUTPATIENT
Start: 2017-10-17 | End: 2017-11-15 | Stop reason: SDUPTHER

## 2017-10-18 ENCOUNTER — TELEPHONE (OUTPATIENT)
Dept: PRIMARY CARE CLINIC | Age: 57
End: 2017-10-18

## 2017-10-18 ASSESSMENT — ENCOUNTER SYMPTOMS
SINUS PRESSURE: 0
NAUSEA: 0
VOMITING: 0
ABDOMINAL PAIN: 0
BACK PAIN: 1
WHEEZING: 0
SHORTNESS OF BREATH: 0
EYE DISCHARGE: 0
COUGH: 0
SORE THROAT: 0

## 2017-11-01 RX ORDER — PANTOPRAZOLE SODIUM 40 MG/1
TABLET, DELAYED RELEASE ORAL
Qty: 60 TABLET | Refills: 2 | Status: SHIPPED | OUTPATIENT
Start: 2017-11-01 | End: 2018-01-31 | Stop reason: ALTCHOICE

## 2017-11-01 RX ORDER — FLUDROCORTISONE ACETATE 0.1 MG/1
TABLET ORAL
Qty: 30 TABLET | Refills: 2 | Status: SHIPPED | OUTPATIENT
Start: 2017-11-01 | End: 2017-11-20

## 2017-11-01 RX ORDER — VENLAFAXINE HYDROCHLORIDE 75 MG/1
CAPSULE, EXTENDED RELEASE ORAL
Qty: 30 CAPSULE | Refills: 2 | Status: SHIPPED | OUTPATIENT
Start: 2017-11-01 | End: 2018-01-31 | Stop reason: ALTCHOICE

## 2017-11-01 RX ORDER — FOLIC ACID 1 MG/1
TABLET ORAL
Qty: 30 TABLET | Refills: 2 | Status: SHIPPED | OUTPATIENT
Start: 2017-11-01 | End: 2018-02-21 | Stop reason: SDUPTHER

## 2017-11-07 ENCOUNTER — HOSPITAL ENCOUNTER (OUTPATIENT)
Dept: GENERAL RADIOLOGY | Age: 57
Discharge: HOME | End: 2017-11-07
Attending: INTERNAL MEDICINE | Admitting: INTERNAL MEDICINE

## 2017-11-07 DIAGNOSIS — Z86.73 HISTORY OF CVA (CEREBROVASCULAR ACCIDENT): ICD-10-CM

## 2017-11-07 DIAGNOSIS — R55 SYNCOPE, UNSPECIFIED SYNCOPE TYPE: ICD-10-CM

## 2017-11-07 DIAGNOSIS — R55 SYNCOPE AND COLLAPSE: ICD-10-CM

## 2017-11-07 DIAGNOSIS — I77.9 CAROTID DISEASE, BILATERAL (HCC): ICD-10-CM

## 2017-11-07 LAB
A/G RATIO: 1.5 (ref 0.8–2)
ALBUMIN SERPL-MCNC: 4.6 G/DL (ref 3.4–4.8)
ALP BLD-CCNC: 128 U/L (ref 25–100)
ALT SERPL-CCNC: 14 U/L (ref 4–36)
ANION GAP SERPL CALCULATED.3IONS-SCNC: 12 MMOL/L (ref 3–16)
AST SERPL-CCNC: 12 U/L (ref 8–33)
BASOPHILS ABSOLUTE: 0.1 K/UL (ref 0–0.1)
BASOPHILS RELATIVE PERCENT: 0.6 %
BILIRUB SERPL-MCNC: <0.2 MG/DL (ref 0.3–1.2)
BUN BLDV-MCNC: 17 MG/DL (ref 6–20)
CALCIUM SERPL-MCNC: 10 MG/DL (ref 8.5–10.5)
CHLORIDE BLD-SCNC: 104 MMOL/L (ref 98–107)
CO2: 25 MMOL/L (ref 20–30)
CREAT SERPL-MCNC: 1.2 MG/DL (ref 0.4–1.2)
EOSINOPHILS ABSOLUTE: 0.2 K/UL (ref 0–0.4)
EOSINOPHILS RELATIVE PERCENT: 2 %
GFR AFRICAN AMERICAN: 56
GFR NON-AFRICAN AMERICAN: 47
GLOBULIN: 3 G/DL
GLUCOSE BLD-MCNC: 104 MG/DL (ref 74–106)
HCT VFR BLD CALC: 40.8 % (ref 37–47)
HEMOGLOBIN: 13 G/DL (ref 11.5–16.5)
LYMPHOCYTES ABSOLUTE: 2.8 K/UL (ref 1.5–4)
LYMPHOCYTES RELATIVE PERCENT: 27.4 % (ref 20–40)
MCH RBC QN AUTO: 28.3 PG (ref 27–32)
MCHC RBC AUTO-ENTMCNC: 31.9 G/DL (ref 31–35)
MCV RBC AUTO: 88.9 FL (ref 80–100)
MONOCYTES ABSOLUTE: 0.5 K/UL (ref 0.2–0.8)
MONOCYTES RELATIVE PERCENT: 5.2 % (ref 3–10)
NEUTROPHILS ABSOLUTE: 6.7 K/UL (ref 2–7.5)
NEUTROPHILS RELATIVE PERCENT: 64.8 %
PDW BLD-RTO: 15.7 % (ref 11–16)
PLATELET # BLD: 294 K/UL (ref 150–400)
PMV BLD AUTO: 11.1 FL (ref 6–10)
POTASSIUM SERPL-SCNC: 4.1 MMOL/L (ref 3.4–5.1)
RBC # BLD: 4.59 M/UL (ref 3.8–5.8)
SODIUM BLD-SCNC: 141 MMOL/L (ref 136–145)
TOTAL PROTEIN: 7.6 G/DL (ref 6.4–8.3)
TSH SERPL DL<=0.05 MIU/L-ACNC: 0.95 UIU/ML (ref 0.35–5.5)
WBC # BLD: 10.3 K/UL (ref 4–11)

## 2017-11-07 RX ORDER — GABAPENTIN 100 MG/1
CAPSULE ORAL
Qty: 90 CAPSULE | Refills: 0 | Status: SHIPPED | OUTPATIENT
Start: 2017-11-07 | End: 2017-11-20 | Stop reason: SDUPTHER

## 2017-11-07 RX ORDER — HYDROCODONE BITARTRATE AND ACETAMINOPHEN 5; 325 MG/1; MG/1
1 TABLET ORAL 2 TIMES DAILY PRN
Qty: 45 TABLET | Refills: 0 | Status: SHIPPED | OUTPATIENT
Start: 2017-11-07 | End: 2017-12-05 | Stop reason: SDUPTHER

## 2017-11-13 ENCOUNTER — TELEPHONE (OUTPATIENT)
Dept: PRIMARY CARE CLINIC | Age: 57
End: 2017-11-13

## 2017-11-15 RX ORDER — CLONAZEPAM 0.5 MG/1
0.5 TABLET ORAL 2 TIMES DAILY
Qty: 60 TABLET | Refills: 0 | Status: SHIPPED | OUTPATIENT
Start: 2017-11-15 | End: 2017-12-12 | Stop reason: SDUPTHER

## 2017-11-20 ENCOUNTER — OFFICE VISIT (OUTPATIENT)
Dept: PRIMARY CARE CLINIC | Age: 57
End: 2017-11-20
Payer: MEDICAID

## 2017-11-20 VITALS
OXYGEN SATURATION: 97 % | DIASTOLIC BLOOD PRESSURE: 70 MMHG | BODY MASS INDEX: 24.78 KG/M2 | HEART RATE: 86 BPM | TEMPERATURE: 97.5 F | WEIGHT: 163 LBS | SYSTOLIC BLOOD PRESSURE: 112 MMHG

## 2017-11-20 DIAGNOSIS — F41.9 ANXIETY: ICD-10-CM

## 2017-11-20 DIAGNOSIS — R42 DIZZINESS: Primary | ICD-10-CM

## 2017-11-20 DIAGNOSIS — Z72.0 TOBACCO ABUSE: ICD-10-CM

## 2017-11-20 DIAGNOSIS — I77.9 CAROTID DISEASE, BILATERAL (HCC): ICD-10-CM

## 2017-11-20 DIAGNOSIS — I10 ESSENTIAL HYPERTENSION: ICD-10-CM

## 2017-11-20 PROCEDURE — 3014F SCREEN MAMMO DOC REV: CPT | Performed by: INTERNAL MEDICINE

## 2017-11-20 PROCEDURE — 99213 OFFICE O/P EST LOW 20 MIN: CPT | Performed by: INTERNAL MEDICINE

## 2017-11-20 PROCEDURE — 4004F PT TOBACCO SCREEN RCVD TLK: CPT | Performed by: INTERNAL MEDICINE

## 2017-11-20 PROCEDURE — G8420 CALC BMI NORM PARAMETERS: HCPCS | Performed by: INTERNAL MEDICINE

## 2017-11-20 PROCEDURE — G8484 FLU IMMUNIZE NO ADMIN: HCPCS | Performed by: INTERNAL MEDICINE

## 2017-11-20 PROCEDURE — 3017F COLORECTAL CA SCREEN DOC REV: CPT | Performed by: INTERNAL MEDICINE

## 2017-11-20 PROCEDURE — G8427 DOCREV CUR MEDS BY ELIG CLIN: HCPCS | Performed by: INTERNAL MEDICINE

## 2017-11-20 RX ORDER — GABAPENTIN 100 MG/1
200 CAPSULE ORAL 3 TIMES DAILY
Qty: 180 CAPSULE | Refills: 1 | Status: SHIPPED | OUTPATIENT
Start: 2017-11-20 | End: 2018-02-06 | Stop reason: SDUPTHER

## 2017-11-20 RX ORDER — LISINOPRIL 5 MG/1
5 TABLET ORAL DAILY
Qty: 30 TABLET | Refills: 3 | Status: SHIPPED | OUTPATIENT
Start: 2017-11-20 | End: 2018-01-31 | Stop reason: ALTCHOICE

## 2017-11-20 ASSESSMENT — ENCOUNTER SYMPTOMS
BACK PAIN: 1
VOMITING: 0
SORE THROAT: 0
EYE DISCHARGE: 0
SINUS PRESSURE: 0
COUGH: 0
NAUSEA: 0
WHEEZING: 0
ABDOMINAL PAIN: 0
SHORTNESS OF BREATH: 0

## 2017-11-20 NOTE — PROGRESS NOTES
Jhonathan Mckenzie yes - 11/20/2017   UDS yes - 11/8/2016   Medication Agreement yes - 9/18/2017      Have you seen any other physician or provider since your last visit no    Have you had any other diagnostic tests since your last visit? yes - Labs, CT Head , Carotid Doppler     Have you changed or stopped any medications since your last visit? No    Patient is here to follow-up from her recent tests. Pt states that she has had some dizzy spells since her last visit.

## 2017-11-20 NOTE — PROGRESS NOTES
SUBJECTIVE:    Patient ID: Nataly Gifford is a 56 y.o. female. Chief Complaint   Patient presents with    Dizziness    Loss of Consciousness    Discuss Labs    Results         HPI:  Patient has had hypertension for few years. She has been compliant with taking medications, without side effects from it. She has been following a low-sodium, is lightly active and rarely exercises. Weight is up, compared to last visit. Her blood pressure is stable at this time. Patient had an episode of syncope prior to last visit. She have some workup for that. She has no more episodes since last visit but having occasionally some mild dizziness mainly when standing up. Not all the time. No headache or vision changes. No nausea or vomiting. No tinnitus. Patient used to be on Florinef but stopped it several months ago. She had a prior history of carotid disease and prior CVA. She continued to smoke. Symptoms hasn't changed much over the past few weeks. She has nerve problem for long time. Her sx has been stable. She occasioally has some difficulties falling and maintaining sleep. She denies any suicidal ideation. She has been compliant with taking medication without side effect. She has supportive family. Patient reported that current regimen has been really help with her controlling her symptoms and make her able to be around people. Patient's medications, allergies, past medical, surgical, social and family histories were reviewed and updated as appropriate. Outpatient Prescriptions Marked as Taking for the 11/20/17 encounter (Office Visit) with Laura Denis MD   Medication Sig Dispense Refill    clonazePAM (KLONOPIN) 0.5 MG tablet Take 1 tablet by mouth 2 times daily . 60 tablet 0    HYDROcodone-acetaminophen (NORCO) 5-325 MG per tablet Take 1 tablet by mouth 2 times daily as needed for Pain .  45 tablet 0    gabapentin (NEURONTIN) 100 MG capsule TAKE 1 CAPSULE BY MOUTH THREE TIMES DAILY 90 capsule 0    01/01/2011   SpO2 97% Comment: room air  BMI 24.78 kg/m²      Physical Exam   Constitutional: She is oriented to person, place, and time. She appears well-developed and well-nourished. HENT:   Head: Normocephalic and atraumatic. Right Ear: External ear normal.   Left Ear: External ear normal.   Nose: Nose normal.   Eyes: Conjunctivae and EOM are normal. Pupils are equal, round, and reactive to light. Neck: Normal range of motion. Neck supple. No JVD present. No thyromegaly present. Cardiovascular: Normal rate, regular rhythm and normal heart sounds. Pulmonary/Chest: Effort normal and breath sounds normal. She has no wheezes. She has no rales. Abdominal: Soft. Bowel sounds are normal. She exhibits no distension. There is no tenderness. Musculoskeletal: She exhibits no edema or tenderness. Neurological: She is alert and oriented to person, place, and time. No cranial nerve deficit. Skin: No rash noted. No erythema. Psychiatric: She has a normal mood and affect. Her behavior is normal. Judgment and thought content normal.   Nursing note and vitals reviewed.       Lab Results   Component Value Date     11/07/2017    K 4.1 11/07/2017     11/07/2017    .0 05/22/2012    CO2 25 11/07/2017    GLUCOSE 104 11/07/2017    BUN 17 11/07/2017    CREATININE 1.2 11/07/2017    CREATININE 0.8 05/22/2012    CALCIUM 10.0 11/07/2017    PROT 7.6 11/07/2017    LABALBU 4.6 11/07/2017    LABALBU 4.3 05/22/2012    BILITOT <0.2 11/07/2017    ALT 14 11/07/2017    AST 12 11/07/2017       Hemoglobin A1C (%)   Date Value   11/09/2015 5.4     Microscopic Examination (no units)   Date Value   08/15/2017 YES     LDL Calculated (mg/dL)   Date Value   04/20/2016 72         Lab Results   Component Value Date    WBC 10.3 11/07/2017    NEUTROABS 6.7 11/07/2017    HGB 13.0 11/07/2017    HCT 40.8 11/07/2017    MCV 88.9 11/07/2017     11/07/2017       Lab Results   Component Value Date    TSH 0.95 11/07/2017 ASSESSMENT/PLAN:     1. Dizziness  Neurologic workup has been unremarkable. Decrease lisinopril. She used to be on Florinef. Monitor for now. 2. Carotid disease, bilateral ([address]  Try to make sure blood pressure and lipid profile under good control. Long conversation with her regarding smoking cessation. Prior left CVA. 3. Essential hypertension  BP is stable. I have advised her on low-sodium diet, exercise and weight control. I am going to decrease Lisinopril due to dizziness. (Mother related to orthostatic hypotension. Used to be on Florinef.)  Will monitor her renal function every few months, have advised her to check blood pressure frequently and to keep a record of this. 4. Tobacco abuse  I had a long discussion with her regarding the risk of smoking especialy with her other medical problems. I have discussed with patient several treatment options (program, nicotine patches and other meds) to help her quit smoking. Patient is not interested at this time. 5. Anxiety  I am going to continue current medication. I advice the patient to seek councelling. I will reassess current condition every few months. Patient to call or RTC if experienced any deterioration of Sx.    1. Goal is to alleviate symptoms to a degree that the patient can participate in own ADLS social activity and improve function. 2. Risk and benefits were reviewed at length, including risk for addiction and possible side effects and interactions. Alternative therapy was discussed and will be a part of the patients overall care. Including but not limited to, other medications as well as behavioral and activity modification and the use of other modalities. 3. This patient does not exhibit a potential for abuse or addiction at this time. Will monitor closely. 4. UDS has been compliant. Will randomly check drug screen. 5. Geofm Pea completed and compliant, will check every 3 months.    6. Advised her  that UDS and possible pill counts and frequent monitoring will be a part of her care. 7. Patient has medication agreement and consent to treat with this office. Patient has been informed not to seek or obtain medication from other practitioners and to notify our office ASAP if this happened on emergent basis. Orders Placed This Encounter   Medications    gabapentin (NEURONTIN) 100 MG capsule     Sig: Take 2 capsules by mouth 3 times daily TAKE 1 CAPSULE BY MOUTH THREE TIMES DAILY     Dispense:  180 capsule     Refill:  1    lisinopril (PRINIVIL;ZESTRIL) 5 MG tablet     Sig: Take 1 tablet by mouth daily     Dispense:  30 tablet     Refill:  3        Written by Marnie Sanchez CMA, acting as a scribe for Dr. Isabel White on 11/20/2017 at 1:36 PM.     I, Dr. Irvin Lopez, personally performed the services described in the documentation as scribed by Marnie Sanchez CMA, in my presence and it is both accurate and complete.

## 2017-11-29 RX ORDER — TRAZODONE HYDROCHLORIDE 50 MG/1
TABLET ORAL
Qty: 30 TABLET | Refills: 0 | Status: SHIPPED | OUTPATIENT
Start: 2017-11-29 | End: 2018-01-24 | Stop reason: SDUPTHER

## 2017-12-05 RX ORDER — HYDROCODONE BITARTRATE AND ACETAMINOPHEN 5; 325 MG/1; MG/1
1 TABLET ORAL 2 TIMES DAILY PRN
Qty: 45 TABLET | Refills: 0 | Status: SHIPPED | OUTPATIENT
Start: 2017-12-05 | End: 2018-01-08 | Stop reason: SDUPTHER

## 2017-12-05 NOTE — TELEPHONE ENCOUNTER
From: Valerie Wade  Sent: 12/5/2017 1:19 PM EST  Subject: Medication Renewal Request    Ramya Zuluaga would like a refill of the following medications:  HYDROcodone-acetaminophen (Renate Gowers) 5-325 MG per tablet Jemal Farmer MD]    Preferred pharmacy: [address] [place] [address] [phone]    Comment:  would you ask doctor olga lidia if he could refill my norco please thank you

## 2017-12-11 RX ORDER — LISINOPRIL 10 MG/1
TABLET ORAL
Qty: 30 TABLET | Refills: 5 | Status: SHIPPED | OUTPATIENT
Start: 2017-12-11 | End: 2018-01-31 | Stop reason: ALTCHOICE

## 2017-12-12 ENCOUNTER — PATIENT MESSAGE (OUTPATIENT)
Dept: PRIMARY CARE CLINIC | Age: 57
End: 2017-12-12

## 2017-12-12 RX ORDER — CLONAZEPAM 0.5 MG/1
0.5 TABLET ORAL 2 TIMES DAILY
Qty: 60 TABLET | Refills: 0 | Status: SHIPPED | OUTPATIENT
Start: 2017-12-12 | End: 2018-01-10 | Stop reason: SDUPTHER

## 2017-12-27 RX ORDER — ROSUVASTATIN CALCIUM 20 MG/1
TABLET, COATED ORAL
Qty: 30 TABLET | Refills: 5 | Status: SHIPPED | OUTPATIENT
Start: 2017-12-27 | End: 2018-01-31 | Stop reason: ALTCHOICE

## 2018-01-08 RX ORDER — HYDROCODONE BITARTRATE AND ACETAMINOPHEN 5; 325 MG/1; MG/1
1 TABLET ORAL 2 TIMES DAILY PRN
Qty: 45 TABLET | Refills: 0 | Status: SHIPPED | OUTPATIENT
Start: 2018-01-08 | End: 2018-02-06 | Stop reason: SDUPTHER

## 2018-01-10 RX ORDER — CLONAZEPAM 0.5 MG/1
0.5 TABLET ORAL 2 TIMES DAILY
Qty: 60 TABLET | Refills: 0 | Status: SHIPPED | OUTPATIENT
Start: 2018-01-10 | End: 2018-02-08 | Stop reason: SDUPTHER

## 2018-01-24 RX ORDER — ASPIRIN 325 MG
325 TABLET, DELAYED RELEASE (ENTERIC COATED) ORAL DAILY
Qty: 30 TABLET | Refills: 5 | Status: SHIPPED | OUTPATIENT
Start: 2018-01-24 | End: 2019-03-05 | Stop reason: SDUPTHER

## 2018-01-24 RX ORDER — TRAZODONE HYDROCHLORIDE 50 MG/1
TABLET ORAL
Qty: 30 TABLET | Refills: 5 | Status: SHIPPED | OUTPATIENT
Start: 2018-01-24 | End: 2018-01-31 | Stop reason: ALTCHOICE

## 2018-01-24 RX ORDER — ESCITALOPRAM OXALATE 20 MG/1
TABLET ORAL
Qty: 30 TABLET | Refills: 5 | Status: SHIPPED | OUTPATIENT
Start: 2018-01-24 | End: 2018-01-31 | Stop reason: ALTCHOICE

## 2018-01-31 ENCOUNTER — OFFICE VISIT (OUTPATIENT)
Dept: PRIMARY CARE CLINIC | Age: 58
End: 2018-01-31
Payer: MEDICAID

## 2018-01-31 VITALS
DIASTOLIC BLOOD PRESSURE: 66 MMHG | RESPIRATION RATE: 18 BRPM | WEIGHT: 161.4 LBS | BODY MASS INDEX: 24.54 KG/M2 | HEART RATE: 119 BPM | SYSTOLIC BLOOD PRESSURE: 116 MMHG

## 2018-01-31 DIAGNOSIS — E78.5 HYPERLIPIDEMIA, UNSPECIFIED HYPERLIPIDEMIA TYPE: Primary | ICD-10-CM

## 2018-01-31 DIAGNOSIS — I10 ESSENTIAL HYPERTENSION: ICD-10-CM

## 2018-01-31 DIAGNOSIS — M48.02 CERVICAL SPINAL STENOSIS: ICD-10-CM

## 2018-01-31 DIAGNOSIS — F41.9 ANXIETY: ICD-10-CM

## 2018-01-31 DIAGNOSIS — M50.30 DEGENERATIVE DISC DISEASE, CERVICAL: ICD-10-CM

## 2018-01-31 PROCEDURE — 4004F PT TOBACCO SCREEN RCVD TLK: CPT | Performed by: INTERNAL MEDICINE

## 2018-01-31 PROCEDURE — G8484 FLU IMMUNIZE NO ADMIN: HCPCS | Performed by: INTERNAL MEDICINE

## 2018-01-31 PROCEDURE — 99214 OFFICE O/P EST MOD 30 MIN: CPT | Performed by: INTERNAL MEDICINE

## 2018-01-31 PROCEDURE — 3014F SCREEN MAMMO DOC REV: CPT | Performed by: INTERNAL MEDICINE

## 2018-01-31 PROCEDURE — G8420 CALC BMI NORM PARAMETERS: HCPCS | Performed by: INTERNAL MEDICINE

## 2018-01-31 PROCEDURE — 3017F COLORECTAL CA SCREEN DOC REV: CPT | Performed by: INTERNAL MEDICINE

## 2018-01-31 PROCEDURE — G8427 DOCREV CUR MEDS BY ELIG CLIN: HCPCS | Performed by: INTERNAL MEDICINE

## 2018-01-31 RX ORDER — LISINOPRIL 10 MG/1
1 TABLET ORAL DAILY
COMMUNITY
Start: 2018-01-08 | End: 2018-07-25 | Stop reason: SDUPTHER

## 2018-01-31 RX ORDER — ROSUVASTATIN CALCIUM 10 MG/1
10 TABLET, COATED ORAL DAILY
Qty: 30 TABLET | Refills: 5 | Status: SHIPPED | OUTPATIENT
Start: 2018-01-31 | End: 2019-03-05

## 2018-01-31 ASSESSMENT — ENCOUNTER SYMPTOMS
SINUS PRESSURE: 0
BACK PAIN: 1
EYE DISCHARGE: 0
COUGH: 0
ABDOMINAL PAIN: 0
SHORTNESS OF BREATH: 0
WHEEZING: 0
NAUSEA: 0
SORE THROAT: 0
VOMITING: 0

## 2018-01-31 NOTE — PROGRESS NOTES
SUBJECTIVE:    Patient ID: Dedrick Chavez is a 56 y.o. female. Chief Complaint   Patient presents with    Hypertension    Anxiety    Back Pain     HPI:  Patient has had hypertension for few years. She has been compliant with taking medications, without side effects from it. She has been following a low-sodium, is lightly active and rarely exercises. Weight is stable, compared to last visit. Her blood pressure is stable at this time. Patient reports her dizziness has gotten better since decreasing Lisinopril. Patient complaints of neck pain. Started several years ago. Pain range is 4-7/10, currently 5/10. Pain radiate toward her shoulders. Pain is worse with movement/activity, better with medications. She reported no weakness in UEs. She reported no numbness. Tried Norco with positive response. No side effect from pain medications. It has been helping her tolerating ADLs and reducing pain to a tolerable level. Patient requested refills on pain meds. Patient with a history of carotid disease. She is not on cholesterol meds. She also continues to smoke. Patient's medications, allergies, past medical, surgical, social and family histories were reviewed and updated as appropriate. Outpatient Prescriptions Marked as Taking for the 1/31/18 encounter (Office Visit) with Ermelinda Montague MD   Medication Sig Dispense Refill    lisinopril (PRINIVIL;ZESTRIL) 10 MG tablet Take 1 tablet by mouth daily      aspirin 325 MG EC tablet TAKE 1 TABLET BY MOUTH DAILY 30 tablet 5    clonazePAM (KLONOPIN) 0.5 MG tablet Take 1 tablet by mouth 2 times daily for 30 days. 60 tablet 0    HYDROcodone-acetaminophen (NORCO) 5-325 MG per tablet Take 1 tablet by mouth 2 times daily as needed for Pain for up to 30 days.  45 tablet 0    gabapentin (NEURONTIN) 100 MG capsule Take 2 capsules by mouth 3 times daily TAKE 1 CAPSULE BY MOUTH THREE TIMES DAILY 180 capsule 1    folic acid (FOLVITE) 1 MG tablet TAKE ONE TABLET BY Future  - Lipid Panel; Future    2. Essential hypertension  BP is stable. I have advised her on low-sodium diet, exercise and weight control. I am going to continue current medication. Will monitor her renal function every few months, have advised her to check blood pressure frequently and to keep a record of this. - Comprehensive Metabolic Panel; Future  - Lipid Panel; Future    3. Anxiety  I am going to continue current medication. I advice the patient to seek councelling. I will reassess current condition every few months. Patient to call or RTC if experienced any deterioration of Sx.    1. Goal is to alleviate symptoms to a degree that the patient can participate in own ADLS social activity and improve function. 2. Risk and benefits were reviewed at length, including risk for addiction and possible side effects and interactions. Alternative therapy was discussed and will be a part of the patients overall care. Including but not limited to, other medications as well as behavioral and activity modification and the use of other modalities. 3. This patient does not exhibit a potential for abuse or addiction at this time. Will monitor closely. 4. UDS has been compliant. Will randomly check drug screen. 5. Fred Larson completed and compliant, will check every 3 months. 6. Advised her  that UDS and possible pill counts and frequent monitoring will be a part of her care. 7. Patient has medication agreement and consent to treat with this office. Patient has been informed not to seek or obtain medication from other practitioners and to notify our office ASAP if this happened on emergent basis. 4. Degenerative disc disease, cervical  MRI C Spine 2012:  1. 1+ C4-C5 degenerative disc disease with small central disc bulge.        2. 3+ C5-C6 and C6-C7 degenerative disc disease with a broad-based        disc bulge producing mild to moderate spinal stenosis.      3.  Right-sided C6-C7 foraminal stenosis 3+ in

## 2018-02-05 ENCOUNTER — PATIENT MESSAGE (OUTPATIENT)
Dept: PRIMARY CARE CLINIC | Age: 58
End: 2018-02-05

## 2018-02-07 RX ORDER — GABAPENTIN 100 MG/1
200 CAPSULE ORAL 3 TIMES DAILY
Qty: 180 CAPSULE | Refills: 1 | Status: SHIPPED | OUTPATIENT
Start: 2018-02-07 | End: 2018-04-04 | Stop reason: SDUPTHER

## 2018-02-07 RX ORDER — HYDROCODONE BITARTRATE AND ACETAMINOPHEN 5; 325 MG/1; MG/1
1 TABLET ORAL 2 TIMES DAILY PRN
Qty: 45 TABLET | Refills: 0 | Status: SHIPPED | OUTPATIENT
Start: 2018-02-07 | End: 2018-03-05 | Stop reason: SDUPTHER

## 2018-02-08 RX ORDER — CLONAZEPAM 0.5 MG/1
0.5 TABLET ORAL 2 TIMES DAILY
Qty: 60 TABLET | Refills: 0 | Status: SHIPPED | OUTPATIENT
Start: 2018-02-08 | End: 2018-03-06 | Stop reason: SDUPTHER

## 2018-02-22 RX ORDER — FLUDROCORTISONE ACETATE 0.1 MG/1
TABLET ORAL
Qty: 30 TABLET | Refills: 5 | Status: SHIPPED | OUTPATIENT
Start: 2018-02-22 | End: 2018-03-20 | Stop reason: ALTCHOICE

## 2018-02-22 RX ORDER — FUROSEMIDE 20 MG/1
TABLET ORAL
Qty: 30 TABLET | Refills: 5 | Status: SHIPPED | OUTPATIENT
Start: 2018-02-22 | End: 2018-03-20 | Stop reason: ALTCHOICE

## 2018-02-22 RX ORDER — VENLAFAXINE HYDROCHLORIDE 75 MG/1
CAPSULE, EXTENDED RELEASE ORAL
Qty: 30 CAPSULE | Refills: 5 | Status: SHIPPED | OUTPATIENT
Start: 2018-02-22 | End: 2019-03-05

## 2018-02-22 RX ORDER — PANTOPRAZOLE SODIUM 40 MG/1
TABLET, DELAYED RELEASE ORAL
Qty: 60 TABLET | Refills: 5 | Status: SHIPPED | OUTPATIENT
Start: 2018-02-22 | End: 2019-03-06 | Stop reason: SDUPTHER

## 2018-02-22 RX ORDER — FOLIC ACID 1 MG/1
TABLET ORAL
Qty: 30 TABLET | Refills: 5 | Status: SHIPPED | OUTPATIENT
Start: 2018-02-22 | End: 2019-03-05 | Stop reason: SDUPTHER

## 2018-02-27 RX ORDER — AMITRIPTYLINE HYDROCHLORIDE 10 MG/1
10 TABLET, FILM COATED ORAL NIGHTLY PRN
Qty: 30 TABLET | Refills: 1 | Status: SHIPPED | OUTPATIENT
Start: 2018-02-27 | End: 2018-03-20

## 2018-03-05 RX ORDER — HYDROCODONE BITARTRATE AND ACETAMINOPHEN 5; 325 MG/1; MG/1
TABLET ORAL
Qty: 45 TABLET | Refills: 0 | Status: SHIPPED | OUTPATIENT
Start: 2018-03-05 | End: 2018-04-04 | Stop reason: SDUPTHER

## 2018-03-06 ENCOUNTER — PATIENT MESSAGE (OUTPATIENT)
Dept: PRIMARY CARE CLINIC | Age: 58
End: 2018-03-06

## 2018-03-06 RX ORDER — CLONAZEPAM 0.5 MG/1
0.5 TABLET ORAL 2 TIMES DAILY
Qty: 60 TABLET | Refills: 0 | Status: SHIPPED | OUTPATIENT
Start: 2018-03-06 | End: 2018-04-06 | Stop reason: SDUPTHER

## 2018-03-20 ENCOUNTER — HOSPITAL ENCOUNTER (OUTPATIENT)
Dept: OTHER | Age: 58
Discharge: HOME | End: 2018-03-20
Attending: INTERNAL MEDICINE | Admitting: INTERNAL MEDICINE

## 2018-03-20 LAB
A/G RATIO: 1.9 (ref 0.8–2)
ALBUMIN SERPL-MCNC: 4.7 G/DL (ref 3.4–4.8)
ALP BLD-CCNC: 157 U/L (ref 25–100)
ALT SERPL-CCNC: 38 U/L (ref 4–36)
ANION GAP SERPL CALCULATED.3IONS-SCNC: 14 MMOL/L (ref 3–16)
AST SERPL-CCNC: 24 U/L (ref 8–33)
BILIRUB SERPL-MCNC: <0.2 MG/DL (ref 0.3–1.2)
BUN BLDV-MCNC: 17 MG/DL (ref 6–20)
CALCIUM SERPL-MCNC: 9.9 MG/DL (ref 8.5–10.5)
CHLORIDE BLD-SCNC: 99 MMOL/L (ref 98–107)
CHOLESTEROL, TOTAL: 152 MG/DL (ref 0–200)
CO2: 27 MMOL/L (ref 20–30)
CREAT SERPL-MCNC: 1.4 MG/DL (ref 0.4–1.2)
GFR AFRICAN AMERICAN: 47
GFR NON-AFRICAN AMERICAN: 39
GLOBULIN: 2.5 G/DL
GLUCOSE BLD-MCNC: 96 MG/DL (ref 74–106)
HDLC SERPL-MCNC: 51 MG/DL (ref 40–60)
LDL CHOLESTEROL CALCULATED: 38 MG/DL
POTASSIUM SERPL-SCNC: 4.4 MMOL/L (ref 3.4–5.1)
SODIUM BLD-SCNC: 140 MMOL/L (ref 136–145)
TOTAL PROTEIN: 7.2 G/DL (ref 6.4–8.3)
TRIGL SERPL-MCNC: 316 MG/DL (ref 0–249)
VLDLC SERPL CALC-MCNC: 63 MG/DL

## 2018-03-28 ENCOUNTER — OFFICE VISIT (OUTPATIENT)
Dept: PRIMARY CARE CLINIC | Age: 58
End: 2018-03-28
Payer: MEDICAID

## 2018-03-28 VITALS
RESPIRATION RATE: 18 BRPM | SYSTOLIC BLOOD PRESSURE: 122 MMHG | HEART RATE: 86 BPM | DIASTOLIC BLOOD PRESSURE: 64 MMHG | BODY MASS INDEX: 24.21 KG/M2 | OXYGEN SATURATION: 98 % | WEIGHT: 159.2 LBS

## 2018-03-28 DIAGNOSIS — I10 ESSENTIAL HYPERTENSION: ICD-10-CM

## 2018-03-28 DIAGNOSIS — M50.30 DEGENERATIVE DISC DISEASE, CERVICAL: ICD-10-CM

## 2018-03-28 DIAGNOSIS — E78.5 HYPERLIPIDEMIA, UNSPECIFIED HYPERLIPIDEMIA TYPE: Primary | ICD-10-CM

## 2018-03-28 DIAGNOSIS — M48.02 CERVICAL SPINAL STENOSIS: ICD-10-CM

## 2018-03-28 DIAGNOSIS — F41.9 ANXIETY: ICD-10-CM

## 2018-03-28 PROCEDURE — 99213 OFFICE O/P EST LOW 20 MIN: CPT | Performed by: INTERNAL MEDICINE

## 2018-03-28 PROCEDURE — G8427 DOCREV CUR MEDS BY ELIG CLIN: HCPCS | Performed by: INTERNAL MEDICINE

## 2018-03-28 PROCEDURE — G8484 FLU IMMUNIZE NO ADMIN: HCPCS | Performed by: INTERNAL MEDICINE

## 2018-03-28 PROCEDURE — 3017F COLORECTAL CA SCREEN DOC REV: CPT | Performed by: INTERNAL MEDICINE

## 2018-03-28 PROCEDURE — 3014F SCREEN MAMMO DOC REV: CPT | Performed by: INTERNAL MEDICINE

## 2018-03-28 PROCEDURE — 4004F PT TOBACCO SCREEN RCVD TLK: CPT | Performed by: INTERNAL MEDICINE

## 2018-03-28 PROCEDURE — G8420 CALC BMI NORM PARAMETERS: HCPCS | Performed by: INTERNAL MEDICINE

## 2018-03-28 RX ORDER — AMITRIPTYLINE HYDROCHLORIDE 10 MG/1
1 TABLET, FILM COATED ORAL NIGHTLY
COMMUNITY
Start: 2018-03-22 | End: 2018-05-29 | Stop reason: SDUPTHER

## 2018-03-28 ASSESSMENT — ENCOUNTER SYMPTOMS
SORE THROAT: 0
NAUSEA: 0
ABDOMINAL PAIN: 0
COUGH: 0
BACK PAIN: 1
VOMITING: 0
WHEEZING: 0
SINUS PRESSURE: 0
SHORTNESS OF BREATH: 0
EYE DISCHARGE: 0

## 2018-04-04 RX ORDER — GABAPENTIN 100 MG/1
CAPSULE ORAL
Qty: 180 CAPSULE | Refills: 1 | Status: SHIPPED | OUTPATIENT
Start: 2018-04-04 | End: 2018-06-04 | Stop reason: SDUPTHER

## 2018-04-04 RX ORDER — HYDROCODONE BITARTRATE AND ACETAMINOPHEN 5; 325 MG/1; MG/1
1 TABLET ORAL 2 TIMES DAILY PRN
Qty: 45 TABLET | Refills: 0 | Status: SHIPPED | OUTPATIENT
Start: 2018-04-04 | End: 2018-05-03 | Stop reason: SDUPTHER

## 2018-04-06 ENCOUNTER — PATIENT MESSAGE (OUTPATIENT)
Dept: PRIMARY CARE CLINIC | Age: 58
End: 2018-04-06

## 2018-04-06 RX ORDER — CLONAZEPAM 0.5 MG/1
0.5 TABLET ORAL 2 TIMES DAILY
Qty: 60 TABLET | Refills: 0 | Status: SHIPPED | OUTPATIENT
Start: 2018-04-06 | End: 2018-05-03 | Stop reason: SDUPTHER

## 2018-04-18 RX ORDER — AMITRIPTYLINE HYDROCHLORIDE 10 MG/1
TABLET, FILM COATED ORAL
Qty: 30 TABLET | Refills: 0 | Status: SHIPPED | OUTPATIENT
Start: 2018-04-18 | End: 2018-06-27 | Stop reason: SDUPTHER

## 2018-05-03 RX ORDER — CLONAZEPAM 0.5 MG/1
0.5 TABLET ORAL 2 TIMES DAILY
Qty: 60 TABLET | Refills: 0 | Status: SHIPPED | OUTPATIENT
Start: 2018-05-03 | End: 2018-05-30 | Stop reason: SDUPTHER

## 2018-05-03 RX ORDER — HYDROCODONE BITARTRATE AND ACETAMINOPHEN 5; 325 MG/1; MG/1
1 TABLET ORAL 2 TIMES DAILY PRN
Qty: 45 TABLET | Refills: 0 | Status: SHIPPED | OUTPATIENT
Start: 2018-05-03 | End: 2018-05-30 | Stop reason: SDUPTHER

## 2018-05-30 DIAGNOSIS — M50.30 DEGENERATIVE DISC DISEASE, CERVICAL: Primary | ICD-10-CM

## 2018-05-30 DIAGNOSIS — F41.9 ANXIETY: ICD-10-CM

## 2018-05-30 RX ORDER — HYDROCODONE BITARTRATE AND ACETAMINOPHEN 5; 325 MG/1; MG/1
1 TABLET ORAL 2 TIMES DAILY PRN
Qty: 45 TABLET | Refills: 0 | Status: SHIPPED | OUTPATIENT
Start: 2018-05-30 | End: 2018-06-26 | Stop reason: SDUPTHER

## 2018-05-30 RX ORDER — CLONAZEPAM 0.5 MG/1
0.5 TABLET ORAL 2 TIMES DAILY
Qty: 60 TABLET | Refills: 0 | Status: SHIPPED | OUTPATIENT
Start: 2018-05-30 | End: 2018-07-06 | Stop reason: SDUPTHER

## 2018-06-04 ENCOUNTER — OFFICE VISIT (OUTPATIENT)
Dept: PRIMARY CARE CLINIC | Age: 58
End: 2018-06-04
Payer: MEDICAID

## 2018-06-04 VITALS
BODY MASS INDEX: 24.74 KG/M2 | SYSTOLIC BLOOD PRESSURE: 90 MMHG | TEMPERATURE: 97.9 F | HEART RATE: 88 BPM | RESPIRATION RATE: 16 BRPM | WEIGHT: 163.2 LBS | HEIGHT: 68 IN | DIASTOLIC BLOOD PRESSURE: 60 MMHG | OXYGEN SATURATION: 96 %

## 2018-06-04 DIAGNOSIS — R79.89 ELEVATED SERUM CREATININE: ICD-10-CM

## 2018-06-04 DIAGNOSIS — M48.02 CERVICAL SPINAL STENOSIS: ICD-10-CM

## 2018-06-04 DIAGNOSIS — I10 ESSENTIAL HYPERTENSION: ICD-10-CM

## 2018-06-04 DIAGNOSIS — F41.9 ANXIETY: ICD-10-CM

## 2018-06-04 DIAGNOSIS — M50.30 DEGENERATIVE DISC DISEASE, CERVICAL: Primary | ICD-10-CM

## 2018-06-04 DIAGNOSIS — E78.5 HYPERLIPIDEMIA, UNSPECIFIED HYPERLIPIDEMIA TYPE: ICD-10-CM

## 2018-06-04 PROCEDURE — G8420 CALC BMI NORM PARAMETERS: HCPCS | Performed by: INTERNAL MEDICINE

## 2018-06-04 PROCEDURE — 4004F PT TOBACCO SCREEN RCVD TLK: CPT | Performed by: INTERNAL MEDICINE

## 2018-06-04 PROCEDURE — 3017F COLORECTAL CA SCREEN DOC REV: CPT | Performed by: INTERNAL MEDICINE

## 2018-06-04 PROCEDURE — G8427 DOCREV CUR MEDS BY ELIG CLIN: HCPCS | Performed by: INTERNAL MEDICINE

## 2018-06-04 PROCEDURE — 99213 OFFICE O/P EST LOW 20 MIN: CPT | Performed by: INTERNAL MEDICINE

## 2018-06-04 RX ORDER — GABAPENTIN 100 MG/1
CAPSULE ORAL
Qty: 180 CAPSULE | Refills: 1 | Status: CANCELLED | OUTPATIENT
Start: 2018-06-04 | End: 2018-07-04

## 2018-06-04 RX ORDER — GABAPENTIN 300 MG/1
300 CAPSULE ORAL 2 TIMES DAILY
Qty: 60 CAPSULE | Refills: 1 | Status: SHIPPED | OUTPATIENT
Start: 2018-06-04 | End: 2019-03-05 | Stop reason: SDUPTHER

## 2018-06-04 ASSESSMENT — ENCOUNTER SYMPTOMS
SORE THROAT: 0
COUGH: 0
SINUS PRESSURE: 0
WHEEZING: 0
ABDOMINAL PAIN: 0
VOMITING: 0
BACK PAIN: 1
EYE DISCHARGE: 0
SHORTNESS OF BREATH: 0
NAUSEA: 0

## 2018-06-18 ENCOUNTER — HOSPITAL ENCOUNTER (OUTPATIENT)
Dept: OTHER | Age: 58
Discharge: HOME | End: 2018-06-18
Attending: INTERNAL MEDICINE | Admitting: INTERNAL MEDICINE

## 2018-06-18 LAB
A/G RATIO: 1.9 (ref 0.8–2)
ALBUMIN SERPL-MCNC: 4.4 G/DL (ref 3.4–4.8)
ALP BLD-CCNC: 154 U/L (ref 25–100)
ALT SERPL-CCNC: 90 U/L (ref 4–36)
ANION GAP SERPL CALCULATED.3IONS-SCNC: 14 MMOL/L (ref 3–16)
AST SERPL-CCNC: 48 U/L (ref 8–33)
BILIRUB SERPL-MCNC: <0.2 MG/DL (ref 0.3–1.2)
BUN BLDV-MCNC: 12 MG/DL (ref 6–20)
CALCIUM SERPL-MCNC: 9.4 MG/DL (ref 8.5–10.5)
CHLORIDE BLD-SCNC: 105 MMOL/L (ref 98–107)
CO2: 23 MMOL/L (ref 20–30)
CREAT SERPL-MCNC: 1.6 MG/DL (ref 0.4–1.2)
GFR AFRICAN AMERICAN: 40
GFR NON-AFRICAN AMERICAN: 33
GLOBULIN: 2.3 G/DL
GLUCOSE BLD-MCNC: 115 MG/DL (ref 74–106)
POTASSIUM SERPL-SCNC: 4.4 MMOL/L (ref 3.4–5.1)
SODIUM BLD-SCNC: 142 MMOL/L (ref 136–145)
TOTAL PROTEIN: 6.7 G/DL (ref 6.4–8.3)

## 2018-06-20 ENCOUNTER — TELEPHONE (OUTPATIENT)
Dept: PRIMARY CARE CLINIC | Age: 58
End: 2018-06-20

## 2018-06-26 DIAGNOSIS — M50.30 DEGENERATIVE DISC DISEASE, CERVICAL: ICD-10-CM

## 2018-06-27 RX ORDER — HYDROCODONE BITARTRATE AND ACETAMINOPHEN 5; 325 MG/1; MG/1
1 TABLET ORAL 2 TIMES DAILY PRN
Qty: 45 TABLET | Refills: 0 | Status: SHIPPED | OUTPATIENT
Start: 2018-06-27 | End: 2018-07-27

## 2018-06-27 RX ORDER — AMITRIPTYLINE HYDROCHLORIDE 10 MG/1
TABLET, FILM COATED ORAL
Qty: 30 TABLET | Refills: 0 | Status: SHIPPED | OUTPATIENT
Start: 2018-06-27 | End: 2019-04-22 | Stop reason: SDUPTHER

## 2018-07-03 ENCOUNTER — PATIENT MESSAGE (OUTPATIENT)
Dept: PRIMARY CARE CLINIC | Age: 58
End: 2018-07-03

## 2018-07-03 DIAGNOSIS — F41.9 ANXIETY: ICD-10-CM

## 2018-07-06 DIAGNOSIS — F41.9 ANXIETY: ICD-10-CM

## 2018-07-06 RX ORDER — CLONAZEPAM 0.5 MG/1
0.5 TABLET ORAL 2 TIMES DAILY
Qty: 60 TABLET | Refills: 0 | Status: SHIPPED | OUTPATIENT
Start: 2018-07-06 | End: 2019-03-05 | Stop reason: ALTCHOICE

## 2018-07-06 NOTE — TELEPHONE ENCOUNTER
From: Dwight Larkin  Sent: 7/5/2018 10:49 AM EDT  Subject: Medication Renewal Request    Lalitha Flaherty would like a refill of the following medications:     clonazePAM (KLONOPIN) 0.5 MG tablet Jonathan Banuelos, DO]    Preferred pharmacy: [facility] [address] [facility]

## 2018-07-06 NOTE — TELEPHONE ENCOUNTER
From: Melissa Farias  To:  Malini Segura MD  Sent: 7/3/2018 8:56 PM EDT  Subject: Prescription Question    would you please refill my klonopin please thank you

## 2018-07-09 RX ORDER — CLONAZEPAM 0.5 MG/1
0.5 TABLET ORAL 2 TIMES DAILY
Qty: 60 TABLET | Refills: 0 | OUTPATIENT
Start: 2018-07-09 | End: 2018-08-08

## 2018-07-18 ENCOUNTER — OFFICE VISIT (OUTPATIENT)
Dept: PRIMARY CARE CLINIC | Age: 58
End: 2018-07-18
Payer: MEDICAID

## 2018-07-18 ENCOUNTER — HOSPITAL ENCOUNTER (OUTPATIENT)
Facility: HOSPITAL | Age: 58
Discharge: HOME | End: 2018-07-18
Payer: MEDICAID

## 2018-07-18 VITALS
WEIGHT: 170 LBS | DIASTOLIC BLOOD PRESSURE: 80 MMHG | HEART RATE: 110 BPM | HEIGHT: 68 IN | OXYGEN SATURATION: 96 % | SYSTOLIC BLOOD PRESSURE: 138 MMHG | BODY MASS INDEX: 25.76 KG/M2 | RESPIRATION RATE: 20 BRPM

## 2018-07-18 DIAGNOSIS — N19 RENAL FAILURE, UNSPECIFIED CHRONICITY: Primary | ICD-10-CM

## 2018-07-18 DIAGNOSIS — M50.30 DEGENERATIVE DISC DISEASE, CERVICAL: ICD-10-CM

## 2018-07-18 DIAGNOSIS — N19 RENAL FAILURE, UNSPECIFIED CHRONICITY: ICD-10-CM

## 2018-07-18 DIAGNOSIS — F41.9 ANXIETY: ICD-10-CM

## 2018-07-18 LAB
APPEARANCE FLUID: ABNORMAL
BILIRUBIN, POC: ABNORMAL
BLOOD URINE, POC: ABNORMAL
CLARITY, POC: ABNORMAL
COLOR, POC: YELLOW
GLUCOSE URINE, POC: ABNORMAL
KETONES, POC: ABNORMAL
LEUKOCYTE EST, POC: ABNORMAL
NITRITE, POC: ABNORMAL
PH, POC: 7.5
PROTEIN, POC: ABNORMAL
SPECIFIC GRAVITY, POC: 1
UROBILINOGEN, POC: 0.2

## 2018-07-18 PROCEDURE — G8417 CALC BMI ABV UP PARAM F/U: HCPCS | Performed by: INTERNAL MEDICINE

## 2018-07-18 PROCEDURE — 81001 URINALYSIS AUTO W/SCOPE: CPT

## 2018-07-18 PROCEDURE — G8427 DOCREV CUR MEDS BY ELIG CLIN: HCPCS | Performed by: INTERNAL MEDICINE

## 2018-07-18 PROCEDURE — 36415 COLL VENOUS BLD VENIPUNCTURE: CPT

## 2018-07-18 PROCEDURE — 99213 OFFICE O/P EST LOW 20 MIN: CPT | Performed by: INTERNAL MEDICINE

## 2018-07-18 PROCEDURE — 3017F COLORECTAL CA SCREEN DOC REV: CPT | Performed by: INTERNAL MEDICINE

## 2018-07-18 PROCEDURE — 87086 URINE CULTURE/COLONY COUNT: CPT

## 2018-07-18 PROCEDURE — 4004F PT TOBACCO SCREEN RCVD TLK: CPT | Performed by: INTERNAL MEDICINE

## 2018-07-18 PROCEDURE — 81002 URINALYSIS NONAUTO W/O SCOPE: CPT | Performed by: INTERNAL MEDICINE

## 2018-07-18 PROCEDURE — 84100 ASSAY OF PHOSPHORUS: CPT

## 2018-07-18 PROCEDURE — 80053 COMPREHEN METABOLIC PANEL: CPT

## 2018-07-18 ASSESSMENT — ENCOUNTER SYMPTOMS
SHORTNESS OF BREATH: 0
WHEEZING: 0
SINUS PRESSURE: 0
ABDOMINAL PAIN: 0
VOMITING: 0
NAUSEA: 0
BACK PAIN: 1
SORE THROAT: 0
EYE DISCHARGE: 0
COUGH: 0

## 2018-07-18 ASSESSMENT — PATIENT HEALTH QUESTIONNAIRE - PHQ9
1. LITTLE INTEREST OR PLEASURE IN DOING THINGS: 0
2. FEELING DOWN, DEPRESSED OR HOPELESS: 0
SUM OF ALL RESPONSES TO PHQ9 QUESTIONS 1 & 2: 0
SUM OF ALL RESPONSES TO PHQ QUESTIONS 1-9: 0

## 2018-07-18 NOTE — PATIENT INSTRUCTIONS
· Keep a list of your medicines with you. List all of the prescription medicines, nonprescription medicines, supplements, natural remedies, and vitamins that you take. Tell your healthcare providers who treat you about all of the products you are taking. Your provider can provide you with a form to keep track of them. Just ask. · Follow the directions that come with your medicine, including information about food or alcohol. Make sure you know how and when to take your medicine. Do not take more or less than you are supposed to take. · Keep all medicines out of the reach of children. · Store medicines according to the directions on the label. · Monitor yourself. Learn to know how your body reacts to your new medicine and keep track of how it makes you feel before attempting (If your provider has allowed you to do so) to drive or go to work. · Seek emergency medical attention if you think you have used too much of this medicine. An overdose of any prescription medicine can be fatal. Overdose symptoms may include extreme drowsiness, muscle weakness, confusion, cold and clammy skin, pinpoint pupils, shallow breathing, slow heart rate, fainting, or coma. · Don't share prescription medicines with others, even when they seem to have the same symptoms. What may be good for you may be harmful to others. · If you are no longer taking a prescribed medication and you have pills left please take your pills out of their original containers. Mix crushed pills with an undesirable substance, such as cat litter or used coffee grounds. Put the mixture into a disposable container with a lid, such as an empty margarine tub, or into a sealable bag. Cover up or remove any of your personal information on the empty containers by covering it with black permanent marker or duct tape. Place the sealed container with the mixture, and the empty drug containers, in the trash.    · If you use a medication that is in the form of a patch, [place] to help you successfully quit smoking. For the best results, work with your doctor. Together, you can test your lung function and compare the results to those of a nonsmoking person. The results can be given to you as your lung age. Finding out your lung age right after having the test done may help you to stop smoking. Your doctor can also discuss with you all of your options and refer you to smoking-cessation support groups. You may wish to use nicotine replacement (gum, patches, inhaler) or one of the prescription medications that have been shown to increase quit rates and prolong abstinence from smoking. But whatever you and your doctor decide on these matters, it will still be you who decides when an how to quit. Here are some techniques:   Switch Brands   Switch to a brand you find distasteful. Change to a brand that is low in tar and nicotine a couple of weeks before your target quit date. This will help change your smoking behavior. However, do not smoke more cigarettes, inhale them more often or more deeply, or place your fingertips over the holes in the filters. All of these actions will increase your nicotine intake, and the idea is to get your body used to functioning without nicotine. Cut Down the Number of Cigarettes You Smoke   Smoke only half of each cigarette. Each day, postpone the lighting of your first cigarette by one hour. Decide you'll only smoke during odd or even hours of the day. Decide beforehand how many cigarettes you'll smoke during the day. For each additional cigarette, give a dollar to your favorite hi. Change your eating habits to help you cut down. For example, drink milk, which many people consider incompatible with smoking. End meals or snacks with something that won't lead to a cigarette. Reach for a glass of juice instead of a cigarette for a \"pick-me-up. \"   Remember: Cutting down can help you quit, but it's not a substitute for quitting.  If you're down to about seven cigarettes a day, it's time to set your target quit date, and get ready to stick to it. Don't Smoke \"Automatically\"   Smoke only those cigarettes you really want. Catch yourself before you light up a cigarette out of pure habit. Don't empty your ashtrays. This will remind you of how many cigarettes you've smoked each day, and the sight and the smell of stale cigarettes butts will be very unpleasant. Make yourself aware of each cigarette by using the opposite hand or putting cigarettes in an unfamiliar location or a different pocket to break the automatic reach. If you light up many times during the day without even thinking about it, try to look in a mirror each time you put a match to your cigarette. You may decide you don't need it. Make Smoking Inconvenient   Stop buying cigarettes by the carton. Wait until one pack is empty before you buy another. Stop carrying cigarettes with you at home or at work. Make them difficult to get to. Make Smoking Unpleasant   Smoke only under circumstances that aren't especially pleasurable for you. If you like to smoke with others, smoke alone. Turn your chair to an empty corner and focus only on the cigarette you are smoking and all its many negative effects. Collect all your cigarette butts in one large glass container as a visual reminder of the filth made by smoking. Just Before Quitting   Practice going without cigarettes. Don't think of never smoking again. Think of quitting in terms of one day at a time . Tell yourself you won't smoke today, and then don't. Clean your clothes to rid them of the cigarette smell, which can linger a long time. On the Day You Quit   Throw away all your cigarettes and matches. Hide your lighters and ashtrays. Visit the dentist and have your teeth cleaned to get rid of tobacco stains. Notice how nice they look and resolve to keep them that way.    Make a list of things you'd like to buy for yourself or someone else. Estimate the cost in terms of packs of cigarettes, and put the money aside to buy these presents. Keep very busy on the big day. Go to the movies, exercise, take long walks, or go bike riding. Remind your family and friends that this is your quit date, and ask them to help you over the rough spots of the first couple of days and weeks. Buy yourself a treat or do something special to celebrate. Telephone and Internet Support   Telephone, web-, and computer-based programs can offer you the support that you need to quit and to stay smoke-free. You can find many programs online, like the American [employer] Association's [place] from Smoking . Immediately After Quitting   Develop a clean, fresh, nonsmoking environment around yourselfat work and at home. Buy yourself flowersyou may be surprised how much you can enjoy their scent now. The first few days after you quit, spend as much free time as possible in places where smoking isn't allowed, such as [address] [place] [address], museums, theaters, department stores, and churches. Drink large quantities of water and fruit juice (but avoid sodas that contain caffeine). Try to avoid alcohol, coffee, and other beverages that you associate with cigarette smoking. Strike up conversation instead of a match for a cigarette. If you miss the sensation of having a cigarette in your hand, play with something elsea pencil, a paper clip, a marble. If you miss having something in your mouth, try toothpicks or a fake cigarette.

## 2018-07-18 NOTE — PROGRESS NOTES
Physical Exam   Constitutional: She is oriented to person, place, and time. She appears well-developed and well-nourished. HENT:   Head: Normocephalic and atraumatic. Right Ear: External ear normal.   Left Ear: External ear normal.   Nose: Nose normal.   Eyes: Conjunctivae and EOM are normal. Pupils are equal, round, and reactive to light. Neck: Normal range of motion. Neck supple. No JVD present. No thyromegaly present. Cardiovascular: Normal rate, regular rhythm and normal heart sounds. Pulmonary/Chest: Effort normal and breath sounds normal. She has no wheezes. She has no rales. Abdominal: Soft. Bowel sounds are normal. She exhibits no distension. There is no tenderness. Musculoskeletal: She exhibits no edema or tenderness. Cervical back: She exhibits decreased range of motion. She exhibits no tenderness and no spasm. Neurological: She is alert and oriented to person, place, and time. No cranial nerve deficit. Skin: No rash noted. No erythema. Psychiatric: She has a normal mood and affect. Her behavior is normal. Judgment and thought content normal.   Nursing note and vitals reviewed.       Lab Results   Component Value Date     06/18/2018    K 4.4 06/18/2018     06/18/2018    .0 05/22/2012    CO2 23 06/18/2018    GLUCOSE 115 06/18/2018    BUN 12 06/18/2018    CREATININE 1.6 06/18/2018    CREATININE 0.8 05/22/2012    CALCIUM 9.4 06/18/2018    PROT 6.7 06/18/2018    LABALBU 4.4 06/18/2018    LABALBU 4.3 05/22/2012    BILITOT <0.2 06/18/2018    ALT 90 06/18/2018    AST 48 06/18/2018       Hemoglobin A1C (%)   Date Value   11/09/2015 5.4     Microscopic Examination (no units)   Date Value   08/15/2017 YES     LDL Calculated (mg/dL)   Date Value   03/20/2018 38         Lab Results   Component Value Date    WBC 10.3 11/07/2017    NEUTROABS 6.7 11/07/2017    HGB 13.0 11/07/2017    HCT 40.8 11/07/2017    MCV 88.9 11/07/2017     11/07/2017       Lab Results

## 2018-07-19 LAB
A/G RATIO: 1.8 (ref 0.8–2)
ALBUMIN SERPL-MCNC: 4.8 G/DL (ref 3.4–4.8)
ALP BLD-CCNC: 156 U/L (ref 25–100)
ALT SERPL-CCNC: 33 U/L (ref 4–36)
AMORPHOUS: ABNORMAL /HPF
ANION GAP SERPL CALCULATED.3IONS-SCNC: 15 MMOL/L (ref 3–16)
AST SERPL-CCNC: 28 U/L (ref 8–33)
BILIRUB SERPL-MCNC: <0.2 MG/DL (ref 0.3–1.2)
BILIRUBIN URINE: NEGATIVE
BLOOD, URINE: NEGATIVE
BUN BLDV-MCNC: 12 MG/DL (ref 6–20)
CALCIUM SERPL-MCNC: 9.9 MG/DL (ref 8.5–10.5)
CHLORIDE BLD-SCNC: 101 MMOL/L (ref 98–107)
CLARITY: CLEAR
CO2: 24 MMOL/L (ref 20–30)
COLOR: YELLOW
CREAT SERPL-MCNC: 1.3 MG/DL (ref 0.4–1.2)
GFR AFRICAN AMERICAN: 51
GFR NON-AFRICAN AMERICAN: 42
GLOBULIN: 2.6 G/DL
GLUCOSE BLD-MCNC: 81 MG/DL (ref 74–106)
GLUCOSE URINE: NEGATIVE MG/DL
KETONES, URINE: NEGATIVE MG/DL
LEUKOCYTE ESTERASE, URINE: ABNORMAL
MICROSCOPIC EXAMINATION: YES
NITRITE, URINE: NEGATIVE
PH UA: 7
PHOSPHORUS: 3.6 MG/DL (ref 2.5–4.5)
POTASSIUM SERPL-SCNC: 5 MMOL/L (ref 3.4–5.1)
PROTEIN UA: NEGATIVE MG/DL
RBC UA: ABNORMAL /HPF (ref 0–2)
SODIUM BLD-SCNC: 140 MMOL/L (ref 136–145)
SPECIFIC GRAVITY UA: 1.01
TOTAL PROTEIN: 7.4 G/DL (ref 6.4–8.3)
URINE REFLEX TO CULTURE: YES
URINE TYPE: ABNORMAL
UROBILINOGEN, URINE: 0.2 E.U./DL
WBC UA: ABNORMAL /HPF (ref 0–5)

## 2018-07-21 LAB — URINE CULTURE, ROUTINE: NORMAL

## 2018-07-25 RX ORDER — LISINOPRIL 10 MG/1
TABLET ORAL
Qty: 30 TABLET | Refills: 0 | Status: SHIPPED | OUTPATIENT
Start: 2018-07-25 | End: 2019-03-05 | Stop reason: ALTCHOICE

## 2018-07-25 RX ORDER — ROSUVASTATIN CALCIUM 20 MG/1
TABLET, COATED ORAL
Qty: 30 TABLET | Refills: 0 | Status: SHIPPED | OUTPATIENT
Start: 2018-07-25 | End: 2019-03-05 | Stop reason: SDUPTHER

## 2018-08-01 ENCOUNTER — TELEPHONE (OUTPATIENT)
Dept: PRIMARY CARE CLINIC | Age: 58
End: 2018-08-01

## 2019-03-05 ENCOUNTER — HOSPITAL ENCOUNTER (OUTPATIENT)
Facility: HOSPITAL | Age: 59
Discharge: HOME | End: 2019-03-05
Payer: MEDICAID

## 2019-03-05 ENCOUNTER — OFFICE VISIT (OUTPATIENT)
Dept: PRIMARY CARE CLINIC | Age: 59
End: 2019-03-05
Payer: MEDICAID

## 2019-03-05 VITALS
OXYGEN SATURATION: 97 % | SYSTOLIC BLOOD PRESSURE: 136 MMHG | BODY MASS INDEX: 28.93 KG/M2 | WEIGHT: 180 LBS | RESPIRATION RATE: 18 BRPM | DIASTOLIC BLOOD PRESSURE: 86 MMHG | HEART RATE: 101 BPM | HEIGHT: 66 IN

## 2019-03-05 DIAGNOSIS — I10 ESSENTIAL HYPERTENSION: ICD-10-CM

## 2019-03-05 DIAGNOSIS — F32.A DEPRESSION, UNSPECIFIED DEPRESSION TYPE: ICD-10-CM

## 2019-03-05 DIAGNOSIS — F41.9 ANXIETY: Primary | ICD-10-CM

## 2019-03-05 DIAGNOSIS — M48.02 CERVICAL SPINAL STENOSIS: ICD-10-CM

## 2019-03-05 DIAGNOSIS — K21.9 GASTROESOPHAGEAL REFLUX DISEASE WITHOUT ESOPHAGITIS: ICD-10-CM

## 2019-03-05 DIAGNOSIS — E78.5 HYPERLIPIDEMIA, UNSPECIFIED HYPERLIPIDEMIA TYPE: ICD-10-CM

## 2019-03-05 DIAGNOSIS — M50.30 DEGENERATIVE DISC DISEASE, CERVICAL: ICD-10-CM

## 2019-03-05 DIAGNOSIS — E53.8 FOLIC ACID DEFICIENCY: ICD-10-CM

## 2019-03-05 PROCEDURE — 4004F PT TOBACCO SCREEN RCVD TLK: CPT | Performed by: NURSE PRACTITIONER

## 2019-03-05 PROCEDURE — G8484 FLU IMMUNIZE NO ADMIN: HCPCS | Performed by: NURSE PRACTITIONER

## 2019-03-05 PROCEDURE — 3017F COLORECTAL CA SCREEN DOC REV: CPT | Performed by: NURSE PRACTITIONER

## 2019-03-05 PROCEDURE — G8417 CALC BMI ABV UP PARAM F/U: HCPCS | Performed by: NURSE PRACTITIONER

## 2019-03-05 PROCEDURE — G8427 DOCREV CUR MEDS BY ELIG CLIN: HCPCS | Performed by: NURSE PRACTITIONER

## 2019-03-05 PROCEDURE — 99214 OFFICE O/P EST MOD 30 MIN: CPT | Performed by: NURSE PRACTITIONER

## 2019-03-05 PROCEDURE — 82746 ASSAY OF FOLIC ACID SERUM: CPT

## 2019-03-05 RX ORDER — SUCRALFATE 1 G/1
1 TABLET ORAL 2 TIMES DAILY
Qty: 60 TABLET | Refills: 0 | Status: SHIPPED | OUTPATIENT
Start: 2019-03-05 | End: 2019-04-01 | Stop reason: SDUPTHER

## 2019-03-05 RX ORDER — ROSUVASTATIN CALCIUM 20 MG/1
TABLET, COATED ORAL
Qty: 30 TABLET | Refills: 2 | Status: SHIPPED | OUTPATIENT
Start: 2019-03-05 | End: 2019-05-21 | Stop reason: SDUPTHER

## 2019-03-05 RX ORDER — TRAZODONE HYDROCHLORIDE 50 MG/1
50 TABLET ORAL NIGHTLY
COMMUNITY
End: 2019-03-19 | Stop reason: SDUPTHER

## 2019-03-05 RX ORDER — GABAPENTIN 300 MG/1
300 CAPSULE ORAL 3 TIMES DAILY
Qty: 90 CAPSULE | Refills: 0 | Status: CANCELLED | OUTPATIENT
Start: 2019-03-05 | End: 2019-04-04

## 2019-03-05 RX ORDER — BUPROPION HYDROCHLORIDE 150 MG/1
150 TABLET, EXTENDED RELEASE ORAL 2 TIMES DAILY
COMMUNITY
End: 2019-03-05

## 2019-03-05 RX ORDER — VENLAFAXINE HYDROCHLORIDE 150 MG/1
150 CAPSULE, EXTENDED RELEASE ORAL DAILY
Qty: 30 CAPSULE | Refills: 0 | Status: SHIPPED | OUTPATIENT
Start: 2019-03-06 | End: 2019-04-01 | Stop reason: SDUPTHER

## 2019-03-05 RX ORDER — GABAPENTIN 300 MG/1
300 CAPSULE ORAL 3 TIMES DAILY
Qty: 90 CAPSULE | Refills: 0 | OUTPATIENT
Start: 2019-03-05 | End: 2019-04-04 | Stop reason: SDUPTHER

## 2019-03-05 RX ORDER — ASPIRIN 325 MG
325 TABLET ORAL DAILY
COMMUNITY
Start: 2013-03-18 | End: 2019-03-05

## 2019-03-05 RX ORDER — ASPIRIN 325 MG
325 TABLET, DELAYED RELEASE (ENTERIC COATED) ORAL DAILY
Qty: 30 TABLET | Refills: 5 | Status: SHIPPED | OUTPATIENT
Start: 2019-03-05 | End: 2019-08-03 | Stop reason: SDUPTHER

## 2019-03-05 RX ORDER — FOLIC ACID 1 MG/1
TABLET ORAL
Qty: 30 TABLET | Refills: 5 | Status: SHIPPED | OUTPATIENT
Start: 2019-03-05 | End: 2019-08-26 | Stop reason: SDUPTHER

## 2019-03-05 ASSESSMENT — PATIENT HEALTH QUESTIONNAIRE - PHQ9
SUM OF ALL RESPONSES TO PHQ9 QUESTIONS 1 & 2: 6
SUM OF ALL RESPONSES TO PHQ QUESTIONS 1-9: 6
SUM OF ALL RESPONSES TO PHQ QUESTIONS 1-9: 6
1. LITTLE INTEREST OR PLEASURE IN DOING THINGS: 3
2. FEELING DOWN, DEPRESSED OR HOPELESS: 3

## 2019-03-06 LAB — FOLATE: 14.86 NG/ML

## 2019-03-06 RX ORDER — PANTOPRAZOLE SODIUM 40 MG/1
TABLET, DELAYED RELEASE ORAL
Qty: 60 TABLET | Refills: 5 | Status: SHIPPED | OUTPATIENT
Start: 2019-03-06 | End: 2019-08-26 | Stop reason: SDUPTHER

## 2019-03-06 ASSESSMENT — ENCOUNTER SYMPTOMS
SHORTNESS OF BREATH: 0
BACK PAIN: 0
WHEEZING: 0
CONSTIPATION: 0
COUGH: 0
EYES NEGATIVE: 1
ABDOMINAL PAIN: 0
NAUSEA: 0
COLOR CHANGE: 0
ALLERGIC/IMMUNOLOGIC NEGATIVE: 1
ABDOMINAL DISTENTION: 0
DIARRHEA: 0

## 2019-03-18 ENCOUNTER — PATIENT MESSAGE (OUTPATIENT)
Dept: PRIMARY CARE CLINIC | Age: 59
End: 2019-03-18

## 2019-03-19 RX ORDER — TRAZODONE HYDROCHLORIDE 50 MG/1
50 TABLET ORAL NIGHTLY
Qty: 30 TABLET | Refills: 5 | Status: SHIPPED | OUTPATIENT
Start: 2019-03-19 | End: 2019-03-20 | Stop reason: SDUPTHER

## 2019-03-20 ENCOUNTER — TELEPHONE (OUTPATIENT)
Dept: PRIMARY CARE CLINIC | Age: 59
End: 2019-03-20

## 2019-03-20 RX ORDER — TRAZODONE HYDROCHLORIDE 50 MG/1
50 TABLET ORAL NIGHTLY
Qty: 30 TABLET | Refills: 5 | Status: SHIPPED | OUTPATIENT
Start: 2019-03-20 | End: 2019-08-21 | Stop reason: SDUPTHER

## 2019-04-04 ENCOUNTER — OFFICE VISIT (OUTPATIENT)
Dept: PRIMARY CARE CLINIC | Age: 59
End: 2019-04-04
Payer: MEDICAID

## 2019-04-04 VITALS
SYSTOLIC BLOOD PRESSURE: 138 MMHG | OXYGEN SATURATION: 99 % | WEIGHT: 177.6 LBS | HEART RATE: 102 BPM | BODY MASS INDEX: 28.54 KG/M2 | DIASTOLIC BLOOD PRESSURE: 72 MMHG | HEIGHT: 66 IN

## 2019-04-04 DIAGNOSIS — E78.5 HYPERLIPIDEMIA, UNSPECIFIED HYPERLIPIDEMIA TYPE: ICD-10-CM

## 2019-04-04 DIAGNOSIS — R53.83 FATIGUE, UNSPECIFIED TYPE: ICD-10-CM

## 2019-04-04 DIAGNOSIS — F41.9 ANXIETY: ICD-10-CM

## 2019-04-04 DIAGNOSIS — Z12.39 SCREENING FOR BREAST CANCER: ICD-10-CM

## 2019-04-04 DIAGNOSIS — Z87.448 HISTORY OF RENAL FAILURE: ICD-10-CM

## 2019-04-04 DIAGNOSIS — M48.02 CERVICAL SPINAL STENOSIS: ICD-10-CM

## 2019-04-04 DIAGNOSIS — M50.30 DEGENERATIVE DISC DISEASE, CERVICAL: Primary | ICD-10-CM

## 2019-04-04 PROCEDURE — 4004F PT TOBACCO SCREEN RCVD TLK: CPT | Performed by: INTERNAL MEDICINE

## 2019-04-04 PROCEDURE — G8417 CALC BMI ABV UP PARAM F/U: HCPCS | Performed by: INTERNAL MEDICINE

## 2019-04-04 PROCEDURE — G8427 DOCREV CUR MEDS BY ELIG CLIN: HCPCS | Performed by: INTERNAL MEDICINE

## 2019-04-04 PROCEDURE — 99214 OFFICE O/P EST MOD 30 MIN: CPT | Performed by: INTERNAL MEDICINE

## 2019-04-04 PROCEDURE — 3017F COLORECTAL CA SCREEN DOC REV: CPT | Performed by: INTERNAL MEDICINE

## 2019-04-04 RX ORDER — GABAPENTIN 300 MG/1
300 CAPSULE ORAL 3 TIMES DAILY
Qty: 90 CAPSULE | Refills: 0 | Status: SHIPPED | OUTPATIENT
Start: 2019-04-04 | End: 2019-04-18

## 2019-04-04 RX ORDER — BACLOFEN 5 MG/1
5 TABLET ORAL 3 TIMES DAILY PRN
Qty: 45 TABLET | Refills: 1 | Status: SHIPPED | OUTPATIENT
Start: 2019-04-04 | End: 2019-06-05

## 2019-04-04 ASSESSMENT — ENCOUNTER SYMPTOMS
NAUSEA: 0
EYE DISCHARGE: 0
SINUS PRESSURE: 0
COUGH: 0
BACK PAIN: 1
SHORTNESS OF BREATH: 0
VOMITING: 0
WHEEZING: 0
SORE THROAT: 0
ABDOMINAL PAIN: 0

## 2019-04-04 NOTE — PROGRESS NOTES
SUBJECTIVE:    Patient ID: Adail Farfan is a 66 y. o.female. Chief Complaint   Patient presents with    Depression     3 week follow up    Anxiety    Gastroesophageal Reflux       HPI:  Pt has been complaining of neck and back pain for years. Pain range is 3-8/10, currently 6/10. Radiate toward her shoulder. Pain is worse with exertion, better with rest. Sx getting fluctuates. No change in B/B. Some stiffness after long sitting or standing. No tingling or and numbness. Tried gabapentin with positive response. No side effect from pain medications. It has been helping her tolerating ADLs and reducing pain to a tolerable level. Patient requested refills on pain meds. Patient has depression and anxiety for long time. Her sx recently got worse. She recently moved back to [place] and was seen by Joseph Newby and restarted on some of her medications. She easily get agitated and nervous. She has some difficulties falling and maintaining sleep at time. She denies any suicidal ideation. She has supportive family. She would like to discuss restarting Klonopin today. Patient is due for a mammogram at this time. She would like to do it at [facility].     Patient also reports low energy level for some time. She is not currently taking any vitamins for fatigue. Patient has had hyperlipidemia for several years. She has  been compliant with low fat diet. She has been compliant with taking the medications, without side effects. Her weight is stable compared to last visit. She is active, and rarely exercises. Patient's medications, allergies, past medical, surgical, social and family histories were reviewed and updated as appropriate in the electronic medical record/chart.         Outpatient Medications Marked as Taking for the 4/4/19 encounter (Office Visit) with Neli Belcher MD   Medication Sig Dispense Refill    sucralfate (CARAFATE) 1 GM tablet TAKE 1 TABLET BY MOUTH TWICE A DAY 60 tablet 0    venlafaxine (EFFEXOR XR) 150 MG extended release capsule TAKE 1 CAPSULE BY MOUTH EVERY DAY 30 capsule 0    traZODone (DESYREL) 50 MG tablet Take 1 tablet by mouth nightly 30 tablet 5    pantoprazole (PROTONIX) 40 MG tablet TAKE ONE TABLET BY MOUTH 2 TIMES A DAY (BEFORE MEALS) 60 tablet 5    folic acid (FOLVITE) 1 MG tablet TAKE ONE TABLET BY MOUTH DAILY 30 tablet 5    aspirin 325 MG EC tablet Take 1 tablet by mouth daily 30 tablet 5    rosuvastatin (CRESTOR) 20 MG tablet TAKE 1 TABLET BY MOUTH DAILY 30 tablet 2    gabapentin (NEURONTIN) 300 MG capsule Take 1 capsule by mouth 3 times daily for 30 days. 90 capsule 0    amitriptyline (ELAVIL) 10 MG tablet TAKE 1 TABLET BY MOUTH NIGHTLY AS NEEDED 30 tablet 0        Review of Systems   Constitutional: Negative for chills and fever. HENT: Negative for congestion, sinus pressure and sore throat. Eyes: Negative for discharge and visual disturbance. Respiratory: Negative for cough, shortness of breath and wheezing. Cardiovascular: Negative for chest pain and palpitations. Gastrointestinal: Negative for abdominal pain, nausea and vomiting. Endocrine: Negative for cold intolerance and heat intolerance. Genitourinary: Negative for dysuria, frequency and urgency. Musculoskeletal: Positive for arthralgias, back pain, neck pain and neck stiffness. Skin: Negative for rash and wound. Neurological: Negative for numbness and headaches. Hematological: Negative. Psychiatric/Behavioral: Positive for sleep disturbance. Negative for agitation, confusion, self-injury and suicidal ideas. The patient is nervous/anxious.         Past Medical History:   Diagnosis Date    Anxiety     Chronic neck pain     Depression     Hyperlipidemia     Hypertension     Stroke ([facility] [address] 10/2011     Past Surgical History:   Procedure Laterality Date    BREAST SURGERY      LUMPECTOMY (LEFT) X2    CARDIAC CATHETERIZATION  09/2011    CAROTID-SUBCLAVIAN BYPASS GRAFT  10/2011    CHOLECYSTECTOMY      HAND SURGERY      RIGHT    TUBAL LIGATION        Family History   Problem Relation Age of Onset    Hypertension Mother     Cancer Father         LUNG    Arthritis Sister     Anxiety Disorder Sister     COPD Sister     Hypertension Brother       Social History     Tobacco Use   Smoking Status Current Every Day Smoker    Packs/day: 1.00    Years: 34.00    Pack years: 34.00    Types: Cigarettes   Smokeless Tobacco Never Used       OBJECTIVE:   Wt Readings from Last 3 Encounters:   04/04/19 177 lb 9.6 oz (80.6 kg)   03/05/19 180 lb (81.6 kg)   07/18/18 170 lb (77.1 kg)     BP Readings from Last 3 Encounters:   04/04/19 138/72   03/05/19 136/86   07/18/18 138/80       /72 (Site: Right Upper Arm, Position: Sitting, Cuff Size: Medium Adult)   Pulse 102   Ht 5' 6\" (1.676 m)   Wt 177 lb 9.6 oz (80.6 kg)   LMP 01/01/2011   SpO2 99%   BMI 28.67 kg/m²      Physical Exam   Constitutional: She is oriented to person, place, and time. She appears well-developed and well-nourished. HENT:   Head: Normocephalic and atraumatic. Right Ear: External ear normal.   Left Ear: External ear normal.   Nose: Nose normal.   Eyes: Pupils are equal, round, and reactive to light. Conjunctivae and EOM are normal.   Neck: Normal range of motion. Neck supple. No JVD present. No thyromegaly present. Cardiovascular: Normal rate, regular rhythm and normal heart sounds. Pulmonary/Chest: Effort normal and breath sounds normal. She has no wheezes. She has no rales. Abdominal: Soft. Bowel sounds are normal. She exhibits no distension. There is no tenderness. Musculoskeletal: She exhibits no edema or tenderness. Cervical back: She exhibits decreased range of motion. She exhibits no tenderness and no spasm. Neurological: She is alert and oriented to person, place, and time. No cranial nerve deficit. Skin: No rash noted. No erythema.    Psychiatric: She has a normal mood and well as behavioral and activity modification and the use of other modalities (chiropractic care ect. ). 3. This patient does not exhibit a potential for abuse or addiction at this time. Will monitor closely. 4. UDS has been compliant. Will randomly check drug screen. 5. Yani Healy completed and compliant, will check every 3 months. 6. Advised her that UDS and possible pill counts and frequent monitoring will be a part of her care. 7. Patient has medication agreement and consent to treat with this office. Patient has been informed not to seek or obtain medication from other practitioners and to notify our office ASAP if this happened on emergent basis. 2. Hyperlipidemia, unspecified hyperlipidemia type  I have advised her on low-fat diet, exercise and weight control. I am going to continue on current medication. I have also advised her on the possible side effects from the medication. I will monitor her liver functions and lipid profile every few months. Lipid labs will be completed soon. Lab Results   Component Value Date    LDLCALC 38 03/20/2018    LDLDIRECT 132 (H) 03/23/2015     - Comprehensive Metabolic Panel; Future  - CBC Auto Differential; Future  - TSH without Reflex; Future  - Lipid Panel; Future    3. History of renal failure   Slightly worsening. I have advised her to avoid any nephrotoxic agents. I am going to monitor renal function periodically. I will make sure that the blood pressure and other medical problems are under good control. Will refer to nephrology if the renal function should begin to deteriorate. Creatinine will be checked in a few days. Last Creatinine is   Lab Results   Component Value Date    CREATININE 1.3 (H) 07/18/2018     - Comprehensive Metabolic Panel; Future  - CBC Auto Differential; Future  - Vitamin D 25 Hydroxy; Future  - Vitamin B12; Future  - Folate; Future    4. Fatigue, unspecified type  Will check lab. Further recommandation based on test results.   Long discussion with patient regarding exercise and weight control. Will check sleep study if w/u is unremarkable. - Comprehensive Metabolic Panel; Future  - CBC Auto Differential; Future  - TSH without Reflex; Future  - Vitamin D 25 Hydroxy; Future  - Vitamin B12; Future  - Folate; Future    5. Screening for breast cancer  Will order mammogram and review results with patient. - Mammography screening bilateral; Future    Advised patient I want her to set up an appointment with [facility] and start treatment before I will restart her on Klonopin. Orders Placed This Encounter   Medications    gabapentin (NEURONTIN) 300 MG capsule     Sig: Take 1 capsule by mouth 3 times daily for 30 days. Dispense:  90 capsule     Refill:  0    baclofen 5 MG TABS     Sig: Take 5 mg by mouth 3 times daily as needed (spasm and pain)     Dispense:  45 tablet     Refill:  1      Written by Pelon Ordonez, acting as a scribe for Dr. Emma Gibson on 4/4/2019 at 3:53 PM.     I, Dr. Annabella Bauman, personally performed the services described in the documentation as scribed by Bakari Loco CMA, in my presence and it is both accurate and complete.

## 2019-04-04 NOTE — PROGRESS NOTES
Patient is here for a 3 week follow up medication changes. She feels that the increase in her effexor and the stomach medication has helped. Have you seen any other physician or provider since your last visit no    Have you had any other diagnostic tests since your last visit?  Yes, labs    Have you changed or stopped any medications since your last visit? no

## 2019-04-18 DIAGNOSIS — M48.02 CERVICAL SPINAL STENOSIS: ICD-10-CM

## 2019-04-18 DIAGNOSIS — M50.30 DEGENERATIVE DISC DISEASE, CERVICAL: ICD-10-CM

## 2019-04-18 RX ORDER — GABAPENTIN 400 MG/1
400 CAPSULE ORAL 3 TIMES DAILY
Qty: 90 CAPSULE | Refills: 0 | Status: SHIPPED | OUTPATIENT
Start: 2019-04-18 | End: 2019-05-21 | Stop reason: SDUPTHER

## 2019-04-22 RX ORDER — AMITRIPTYLINE HYDROCHLORIDE 10 MG/1
TABLET, FILM COATED ORAL
Qty: 30 TABLET | Refills: 0 | Status: SHIPPED | OUTPATIENT
Start: 2019-04-22 | End: 2019-04-23 | Stop reason: SDUPTHER

## 2019-04-23 RX ORDER — AMITRIPTYLINE HYDROCHLORIDE 10 MG/1
TABLET, FILM COATED ORAL
Qty: 30 TABLET | Refills: 0 | Status: SHIPPED | OUTPATIENT
Start: 2019-04-23 | End: 2019-05-22 | Stop reason: SDUPTHER

## 2019-04-29 DIAGNOSIS — F41.9 ANXIETY: ICD-10-CM

## 2019-04-29 DIAGNOSIS — K21.9 GASTROESOPHAGEAL REFLUX DISEASE WITHOUT ESOPHAGITIS: ICD-10-CM

## 2019-04-29 DIAGNOSIS — F32.A DEPRESSION, UNSPECIFIED DEPRESSION TYPE: ICD-10-CM

## 2019-04-29 RX ORDER — VENLAFAXINE HYDROCHLORIDE 150 MG/1
CAPSULE, EXTENDED RELEASE ORAL
Qty: 30 CAPSULE | Refills: 0 | Status: SHIPPED | OUTPATIENT
Start: 2019-04-29 | End: 2019-04-30 | Stop reason: SDUPTHER

## 2019-04-29 RX ORDER — SUCRALFATE 1 G/1
TABLET ORAL
Qty: 60 TABLET | Refills: 0 | Status: SHIPPED | OUTPATIENT
Start: 2019-04-29 | End: 2019-06-05

## 2019-04-30 DIAGNOSIS — F32.A DEPRESSION, UNSPECIFIED DEPRESSION TYPE: ICD-10-CM

## 2019-04-30 DIAGNOSIS — F41.9 ANXIETY: ICD-10-CM

## 2019-04-30 RX ORDER — VENLAFAXINE HYDROCHLORIDE 150 MG/1
CAPSULE, EXTENDED RELEASE ORAL
Qty: 30 CAPSULE | Refills: 1 | Status: SHIPPED | OUTPATIENT
Start: 2019-04-30 | End: 2019-05-27 | Stop reason: SDUPTHER

## 2019-05-07 LAB
ALBUMIN SERPL-MCNC: 4.2 G/DL
ALP BLD-CCNC: 131 U/L
ALT SERPL-CCNC: 29 U/L
ANION GAP SERPL CALCULATED.3IONS-SCNC: 10 MMOL/L
AST SERPL-CCNC: 23 U/L
BILIRUB SERPL-MCNC: 0.2 MG/DL (ref 0.1–1.4)
BUN BLDV-MCNC: 18 MG/DL
CALCIUM SERPL-MCNC: 9.4 MG/DL
CHLORIDE BLD-SCNC: 107 MMOL/L
CO2: 25 MMOL/L
CREAT SERPL-MCNC: 1.3 MG/DL
GFR CALCULATED: NORMAL
GLUCOSE BLD-MCNC: 100 MG/DL
POTASSIUM SERPL-SCNC: 4.9 MMOL/L
SODIUM BLD-SCNC: 142 MMOL/L
TOTAL PROTEIN: 6.7
TSH SERPL DL<=0.05 MIU/L-ACNC: 0.99 UIU/ML
VITAMIN B-12: 294

## 2019-05-08 DIAGNOSIS — Z87.448 HISTORY OF RENAL FAILURE: ICD-10-CM

## 2019-05-08 DIAGNOSIS — R53.83 FATIGUE, UNSPECIFIED TYPE: ICD-10-CM

## 2019-05-08 DIAGNOSIS — E78.5 HYPERLIPIDEMIA, UNSPECIFIED HYPERLIPIDEMIA TYPE: ICD-10-CM

## 2019-05-14 RX ORDER — ERGOCALCIFEROL (VITAMIN D2) 1250 MCG
50000 CAPSULE ORAL
Qty: 10 CAPSULE | Refills: 5 | Status: SHIPPED | OUTPATIENT
Start: 2019-05-14 | End: 2019-06-05

## 2019-05-21 DIAGNOSIS — M48.02 CERVICAL SPINAL STENOSIS: ICD-10-CM

## 2019-05-21 DIAGNOSIS — E78.5 HYPERLIPIDEMIA, UNSPECIFIED HYPERLIPIDEMIA TYPE: ICD-10-CM

## 2019-05-21 DIAGNOSIS — M50.30 DEGENERATIVE DISC DISEASE, CERVICAL: ICD-10-CM

## 2019-05-21 RX ORDER — ROSUVASTATIN CALCIUM 20 MG/1
TABLET, COATED ORAL
Qty: 30 TABLET | Refills: 3 | Status: SHIPPED | OUTPATIENT
Start: 2019-05-21 | End: 2019-05-22 | Stop reason: SDUPTHER

## 2019-05-21 RX ORDER — GABAPENTIN 400 MG/1
CAPSULE ORAL
Qty: 90 CAPSULE | Refills: 0 | Status: SHIPPED | OUTPATIENT
Start: 2019-05-21 | End: 2019-06-18 | Stop reason: SDUPTHER

## 2019-05-22 DIAGNOSIS — E78.5 HYPERLIPIDEMIA, UNSPECIFIED HYPERLIPIDEMIA TYPE: ICD-10-CM

## 2019-05-22 RX ORDER — AMITRIPTYLINE HYDROCHLORIDE 10 MG/1
TABLET, FILM COATED ORAL
Qty: 30 TABLET | Refills: 1 | Status: SHIPPED | OUTPATIENT
Start: 2019-05-22 | End: 2019-06-18 | Stop reason: SDUPTHER

## 2019-05-22 RX ORDER — ROSUVASTATIN CALCIUM 20 MG/1
TABLET, COATED ORAL
Qty: 30 TABLET | Refills: 3 | Status: SHIPPED | OUTPATIENT
Start: 2019-05-22 | End: 2019-09-17 | Stop reason: SDUPTHER

## 2019-05-27 DIAGNOSIS — F41.9 ANXIETY: ICD-10-CM

## 2019-05-27 DIAGNOSIS — F32.A DEPRESSION, UNSPECIFIED DEPRESSION TYPE: ICD-10-CM

## 2019-05-28 RX ORDER — VENLAFAXINE HYDROCHLORIDE 150 MG/1
CAPSULE, EXTENDED RELEASE ORAL
Qty: 30 CAPSULE | Refills: 1 | Status: SHIPPED | OUTPATIENT
Start: 2019-05-28 | End: 2019-09-09 | Stop reason: SDUPTHER

## 2019-06-05 ENCOUNTER — OFFICE VISIT (OUTPATIENT)
Dept: PRIMARY CARE CLINIC | Age: 59
End: 2019-06-05
Payer: MEDICAID

## 2019-06-05 VITALS
DIASTOLIC BLOOD PRESSURE: 70 MMHG | HEART RATE: 96 BPM | WEIGHT: 169.6 LBS | OXYGEN SATURATION: 96 % | RESPIRATION RATE: 18 BRPM | SYSTOLIC BLOOD PRESSURE: 118 MMHG | BODY MASS INDEX: 27.37 KG/M2

## 2019-06-05 DIAGNOSIS — F32.A DEPRESSION, UNSPECIFIED DEPRESSION TYPE: ICD-10-CM

## 2019-06-05 DIAGNOSIS — F41.9 ANXIETY: ICD-10-CM

## 2019-06-05 PROCEDURE — 3017F COLORECTAL CA SCREEN DOC REV: CPT | Performed by: INTERNAL MEDICINE

## 2019-06-05 PROCEDURE — 4004F PT TOBACCO SCREEN RCVD TLK: CPT | Performed by: INTERNAL MEDICINE

## 2019-06-05 PROCEDURE — 99213 OFFICE O/P EST LOW 20 MIN: CPT | Performed by: INTERNAL MEDICINE

## 2019-06-05 PROCEDURE — 3014F SCREEN MAMMO DOC REV: CPT | Performed by: INTERNAL MEDICINE

## 2019-06-05 PROCEDURE — G8427 DOCREV CUR MEDS BY ELIG CLIN: HCPCS | Performed by: INTERNAL MEDICINE

## 2019-06-05 PROCEDURE — G8417 CALC BMI ABV UP PARAM F/U: HCPCS | Performed by: INTERNAL MEDICINE

## 2019-06-05 RX ORDER — CLONAZEPAM 0.5 MG/1
0.25 TABLET ORAL DAILY PRN
Qty: 14 TABLET | Refills: 0 | Status: SHIPPED | OUTPATIENT
Start: 2019-06-05 | End: 2019-06-29 | Stop reason: SDUPTHER

## 2019-06-05 RX ORDER — CLOTRIMAZOLE AND BETAMETHASONE DIPROPIONATE 10; .64 MG/G; MG/G
CREAM TOPICAL
Qty: 45 G | Refills: 1 | Status: SHIPPED | OUTPATIENT
Start: 2019-06-05 | End: 2019-11-18

## 2019-06-05 ASSESSMENT — ENCOUNTER SYMPTOMS
EYE DISCHARGE: 0
VOMITING: 0
SORE THROAT: 0
ABDOMINAL PAIN: 0
NAUSEA: 0
SINUS PRESSURE: 0
WHEEZING: 0
SHORTNESS OF BREATH: 0
BACK PAIN: 1
COUGH: 0

## 2019-06-05 NOTE — PROGRESS NOTES
SUBJECTIVE:    Patient ID: Levy Hercules is a 64 y. o.female. Chief Complaint   Patient presents with    Anxiety    Back Pain    Neck Pain     HPI:  Patient has been having issues with anxiety and depression for many years. Her sx recently got worse. She easily get agitated and nervous. She has some difficulties falling and maintaining sleep at time. She denies any suicidal ideation. She has supportive family. Last visit I asked her to keep her appointment with [facility] before I will start her on Klonopin as she suggested she has not been to counseling. Patient's medications, allergies, past medical, surgical, social and family histories were reviewed and updated as appropriate in the electronic medical record/chart. Outpatient Medications Marked as Taking for the 6/5/19 encounter (Office Visit) with Akbar Delacruz MD   Medication Sig Dispense Refill    venlafaxine (EFFEXOR XR) 150 MG extended release capsule TAKE 1 CAPSULE BY MOUTH EVERY DAY 30 capsule 1    amitriptyline (ELAVIL) 10 MG tablet TAKE 1 TABLET BY MOUTH NIGHTLY AS NEEDED 30 tablet 1    rosuvastatin (CRESTOR) 20 MG tablet TAKE 1 TABLET BY MOUTH DAILY 30 tablet 3    gabapentin (NEURONTIN) 400 MG capsule TAKE 1 CAPSULE BY MOUTH THREE TIMES A DAY 90 capsule 0    sucralfate (CARAFATE) 1 GM tablet TAKE 1 TABLET BY MOUTH TWICE A DAY 60 tablet 0    traZODone (DESYREL) 50 MG tablet Take 1 tablet by mouth nightly 30 tablet 5    pantoprazole (PROTONIX) 40 MG tablet TAKE ONE TABLET BY MOUTH 2 TIMES A DAY (BEFORE MEALS) 60 tablet 5    folic acid (FOLVITE) 1 MG tablet TAKE ONE TABLET BY MOUTH DAILY 30 tablet 5    aspirin 325 MG EC tablet Take 1 tablet by mouth daily 30 tablet 5        Review of Systems   Constitutional: Negative for chills and fever. HENT: Negative for congestion, sinus pressure and sore throat. Eyes: Negative for discharge and visual disturbance. Respiratory: Negative for cough, shortness of breath and wheezing. Cardiovascular: Negative for chest pain and palpitations. Gastrointestinal: Negative for abdominal pain, nausea and vomiting. Endocrine: Negative for cold intolerance and heat intolerance. Genitourinary: Negative for dysuria, frequency and urgency. Musculoskeletal: Positive for arthralgias, back pain, neck pain and neck stiffness. Skin: Negative for rash and wound. Neurological: Negative for numbness and headaches. Hematological: Negative. Psychiatric/Behavioral: Positive for sleep disturbance. Negative for agitation, confusion, self-injury and suicidal ideas. The patient is nervous/anxious. Past Medical History:   Diagnosis Date    Anxiety     Chronic neck pain     Depression     Hyperlipidemia     Hypertension     Stroke ([address] 10/2011     Past Surgical History:   Procedure Laterality Date    BREAST SURGERY      LUMPECTOMY (LEFT) X2    CARDIAC CATHETERIZATION  09/2011    CAROTID-SUBCLAVIAN BYPASS GRAFT  10/2011    CHOLECYSTECTOMY      HAND SURGERY      RIGHT    TUBAL LIGATION        Family History   Problem Relation Age of Onset    Hypertension Mother     Cancer Father         LUNG    Arthritis Sister     Anxiety Disorder Sister     COPD Sister     Hypertension Brother       Social History     Tobacco Use   Smoking Status Current Every Day Smoker    Packs/day: 1.00    Years: 34.00    Pack years: 34.00    Types: Cigarettes   Smokeless Tobacco Never Used       OBJECTIVE:   Wt Readings from Last 3 Encounters:   06/05/19 169 lb 9.6 oz (76.9 kg)   04/04/19 177 lb 9.6 oz (80.6 kg)   03/05/19 180 lb (81.6 kg)     BP Readings from Last 3 Encounters:   06/05/19 118/70   04/04/19 138/72   03/05/19 136/86       /70 (Site: Right Upper Arm, Position: Sitting, Cuff Size: Medium Adult)   Pulse 96   Resp 18   Wt 169 lb 9.6 oz (76.9 kg)   LMP 01/01/2011   SpO2 96%   BMI 27.37 kg/m²      Physical Exam   Constitutional: She is oriented to person, place, and time.  She appears well-developed and well-nourished. HENT:   Head: Normocephalic and atraumatic. Right Ear: External ear normal.   Left Ear: External ear normal.   Nose: Nose normal.   Eyes: Pupils are equal, round, and reactive to light. Conjunctivae and EOM are normal.   Neck: Normal range of motion. Neck supple. No JVD present. No thyromegaly present. Cardiovascular: Normal rate, regular rhythm and normal heart sounds. Pulmonary/Chest: Effort normal and breath sounds normal. She has no wheezes. She has no rales. Abdominal: Soft. Bowel sounds are normal. She exhibits no distension. There is no tenderness. Musculoskeletal: She exhibits no edema or tenderness. Cervical back: She exhibits decreased range of motion. She exhibits no tenderness and no spasm. Neurological: She is alert and oriented to person, place, and time. No cranial nerve deficit. Skin: No rash noted. No erythema. Psychiatric: She has a normal mood and affect. Her behavior is normal. Judgment and thought content normal.   Nursing note and vitals reviewed. Lab Results   Component Value Date     05/07/2019    K 4.9 05/07/2019     05/07/2019    .0 05/22/2012    CO2 25 05/07/2019    GLUCOSE 100 05/07/2019    BUN 18 05/07/2019    CREATININE 1.3 05/07/2019    CREATININE 0.8 05/22/2012    CALCIUM 9.4 05/07/2019    PROT 7.4 07/18/2018    LABALBU 4.2 05/07/2019    LABALBU 4.3 05/22/2012    BILITOT 0.2 05/07/2019    ALT 29 05/07/2019    AST 23 05/07/2019       Hemoglobin A1C (%)   Date Value   11/09/2015 5.4     Microscopic Examination (no units)   Date Value   07/18/2018 YES     LDL Calculated (mg/dL)   Date Value   03/20/2018 38         Lab Results   Component Value Date    WBC 10.3 11/07/2017    NEUTROABS 6.7 11/07/2017    HGB 13.0 11/07/2017    HCT 40.8 11/07/2017    MCV 88.9 11/07/2017     11/07/2017       Lab Results   Component Value Date    TSH 0.99 05/07/2019       ASSESSMENT/PLAN:     1.  Anxiety  I am going to continue current medication and add Klonopin. I advised patient I want her to make appointment with [facility] and call me to let me know when her appointment is. Directions for making appointment were written down for her today. I will reassess current condition every few months. Patient to call or RTC if experienced any deterioration of Sx.    1. Goal is to alleviate symptoms to a degree that the patient can participate in own ADLS social activity and improve function. 2. Risk and benefits were reviewed at length, including risk for addiction and possible side effects and interactions. Alternative therapy was discussed and will be a part of the patients overall care. Including but not limited to, other medications as well as behavioral and activity modification and the use of other modalities. 3. This patient does not exhibit a potential for abuse or addiction at this time. Will monitor closely. 4. UDS has been compliant. Will randomly check drug screen. 5. Jay Jay Ripa completed and compliant, will check every 3 months. 6. Advised her  that UDS and possible pill counts and frequent monitoring will be a part of her care. 7. Patient to sign medication agreement and consent to treat with this office. Patient has been informed not to seek or obtain medication from other practitioners and to notify our office ASAP if this happened on emergent basis. - clonazePAM (KLONOPIN) 0.5 MG tablet; Take 0.5 tablets by mouth daily as needed for Anxiety for up to 14 days. Dispense: 14 tablet; Refill: 0    2. Depression, unspecified depression type  As per #1.     - clonazePAM (KLONOPIN) 0.5 MG tablet; Take 0.5 tablets by mouth daily as needed for Anxiety for up to 14 days. Dispense: 14 tablet;  Refill: 0      Orders Placed This Encounter   Medications    vitamin D ([employer] D3) 5000 units CAPS capsule     Sig: Take 1 capsule by mouth daily     Dispense:  90 capsule     Refill:  2    clotrimazole-betamethasone (LOTRISONE) 1-0.05 % cream     Sig: Apply topically 2 times daily. Dispense:  45 g     Refill:  1      Written by Donavon Lawson CMA, acting as a scribe for Dr. Roxana Garza on 6/5/2019 at 2:41 PM.     I, Dr. Brenda Meadows, personally performed the services described in the documentation as scribed by Donavon Lawson CMA, in my presence and it is both accurate and complete.

## 2019-06-05 NOTE — PROGRESS NOTES
Have you seen any other physician or provider since your last visit no    Have you had any other diagnostic tests since your last visit? yes - labs     Have you changed or stopped any medications since your last visit? yes - pt stopped vit d and baclofen due to cost,. Pt here for fu on anxiety, back and neck pain. Pt rates pain a 3/10 today. Patient counseled on need for colon cancer screening and procedure options. At this time patient refuses both Colonoscopy and Fit Testing due to pt refused. Provider notified.

## 2019-06-10 DIAGNOSIS — R92.8 ABNORMAL MAMMOGRAM: Primary | ICD-10-CM

## 2019-06-11 DIAGNOSIS — Z12.39 SCREENING FOR BREAST CANCER: ICD-10-CM

## 2019-06-18 DIAGNOSIS — M50.30 DEGENERATIVE DISC DISEASE, CERVICAL: ICD-10-CM

## 2019-06-18 DIAGNOSIS — M48.02 CERVICAL SPINAL STENOSIS: ICD-10-CM

## 2019-06-18 RX ORDER — AMITRIPTYLINE HYDROCHLORIDE 10 MG/1
TABLET, FILM COATED ORAL
Qty: 30 TABLET | Refills: 1 | Status: SHIPPED | OUTPATIENT
Start: 2019-06-18 | End: 2019-09-17 | Stop reason: SDUPTHER

## 2019-06-18 RX ORDER — GABAPENTIN 400 MG/1
CAPSULE ORAL
Qty: 90 CAPSULE | Refills: 0 | Status: SHIPPED | OUTPATIENT
Start: 2019-06-18 | End: 2020-01-09 | Stop reason: ALTCHOICE

## 2019-06-19 NOTE — TELEPHONE ENCOUNTER
Spoke with patient and let her know that I was only calling to let her know she was to only be taking 0.5 tabs of her Klonopin and her last Rx should have lasted all month per Dr. Maria Teresa Sharma. She showed VU.

## 2019-06-19 NOTE — TELEPHONE ENCOUNTER
Jasvir Alberta called stating that she has a question on her medication order, Please Return call to  her to advise.     Last Appt:  6/5/2019  Next Appt:   9/5/2019  Preferred Pharmacy: On file

## 2019-06-29 DIAGNOSIS — F41.9 ANXIETY: ICD-10-CM

## 2019-06-29 DIAGNOSIS — F32.A DEPRESSION, UNSPECIFIED DEPRESSION TYPE: ICD-10-CM

## 2019-07-01 DIAGNOSIS — R92.8 ABNORMAL MAMMOGRAM: ICD-10-CM

## 2019-07-01 RX ORDER — CLONAZEPAM 0.5 MG/1
TABLET ORAL
Qty: 14 TABLET | Refills: 0 | Status: SHIPPED | OUTPATIENT
Start: 2019-07-01 | End: 2019-09-17 | Stop reason: SDUPTHER

## 2019-07-08 ENCOUNTER — TELEPHONE (OUTPATIENT)
Dept: PRIMARY CARE CLINIC | Age: 59
End: 2019-07-08

## 2019-07-08 DIAGNOSIS — R92.8 ABNORMAL MAMMOGRAM: Primary | ICD-10-CM

## 2019-07-29 ENCOUNTER — TELEPHONE (OUTPATIENT)
Dept: PRIMARY CARE CLINIC | Age: 59
End: 2019-07-29

## 2019-08-21 RX ORDER — TRAZODONE HYDROCHLORIDE 50 MG/1
50 TABLET ORAL NIGHTLY
Qty: 30 TABLET | Refills: 2 | Status: SHIPPED | OUTPATIENT
Start: 2019-08-21 | End: 2019-11-19 | Stop reason: SDUPTHER

## 2019-08-26 DIAGNOSIS — M50.30 DEGENERATIVE DISC DISEASE, CERVICAL: ICD-10-CM

## 2019-08-26 DIAGNOSIS — M48.02 CERVICAL SPINAL STENOSIS: ICD-10-CM

## 2019-08-26 RX ORDER — PANTOPRAZOLE SODIUM 40 MG/1
TABLET, DELAYED RELEASE ORAL
Qty: 60 TABLET | Refills: 5 | Status: SHIPPED | OUTPATIENT
Start: 2019-08-26 | End: 2020-02-21

## 2019-08-26 RX ORDER — FOLIC ACID 1 MG/1
TABLET ORAL
Qty: 30 TABLET | Refills: 5 | Status: SHIPPED | OUTPATIENT
Start: 2019-08-26 | End: 2019-10-30 | Stop reason: SDUPTHER

## 2019-09-05 RX ORDER — DICLOFENAC SODIUM 75 MG/1
75 TABLET, DELAYED RELEASE ORAL 2 TIMES DAILY
Refills: 0 | COMMUNITY
Start: 2019-08-23 | End: 2019-09-17 | Stop reason: ALTCHOICE

## 2019-09-07 ENCOUNTER — TELEPHONE (OUTPATIENT)
Dept: PRIMARY CARE CLINIC | Age: 59
End: 2019-09-07

## 2019-09-07 DIAGNOSIS — I10 ESSENTIAL HYPERTENSION: ICD-10-CM

## 2019-09-07 DIAGNOSIS — E78.5 HYPERLIPIDEMIA, UNSPECIFIED HYPERLIPIDEMIA TYPE: ICD-10-CM

## 2019-09-09 DIAGNOSIS — F32.A DEPRESSION, UNSPECIFIED DEPRESSION TYPE: ICD-10-CM

## 2019-09-09 DIAGNOSIS — F41.9 ANXIETY: ICD-10-CM

## 2019-09-09 RX ORDER — ASPIRIN 325 MG
TABLET, DELAYED RELEASE (ENTERIC COATED) ORAL
Qty: 30 TABLET | Refills: 0 | Status: SHIPPED | OUTPATIENT
Start: 2019-09-09 | End: 2019-10-30 | Stop reason: SDUPTHER

## 2019-09-09 RX ORDER — VENLAFAXINE HYDROCHLORIDE 150 MG/1
CAPSULE, EXTENDED RELEASE ORAL
Qty: 30 CAPSULE | Refills: 1 | Status: SHIPPED | OUTPATIENT
Start: 2019-09-09 | End: 2019-11-16 | Stop reason: SDUPTHER

## 2019-09-10 RX ORDER — DICLOFENAC SODIUM 75 MG/1
75 TABLET, DELAYED RELEASE ORAL 2 TIMES DAILY
Qty: 60 TABLET | Refills: 3 | OUTPATIENT
Start: 2019-09-10

## 2019-09-17 ENCOUNTER — OFFICE VISIT (OUTPATIENT)
Dept: PRIMARY CARE CLINIC | Age: 59
End: 2019-09-17
Payer: MEDICAID

## 2019-09-17 VITALS
WEIGHT: 165.6 LBS | OXYGEN SATURATION: 95 % | SYSTOLIC BLOOD PRESSURE: 102 MMHG | BODY MASS INDEX: 26.73 KG/M2 | HEART RATE: 98 BPM | DIASTOLIC BLOOD PRESSURE: 70 MMHG | RESPIRATION RATE: 18 BRPM

## 2019-09-17 DIAGNOSIS — E78.5 HYPERLIPIDEMIA, UNSPECIFIED HYPERLIPIDEMIA TYPE: ICD-10-CM

## 2019-09-17 DIAGNOSIS — Z12.11 COLON CANCER SCREENING: ICD-10-CM

## 2019-09-17 DIAGNOSIS — F41.9 ANXIETY: Primary | ICD-10-CM

## 2019-09-17 DIAGNOSIS — F32.A DEPRESSION, UNSPECIFIED DEPRESSION TYPE: ICD-10-CM

## 2019-09-17 PROCEDURE — 3014F SCREEN MAMMO DOC REV: CPT | Performed by: INTERNAL MEDICINE

## 2019-09-17 PROCEDURE — G8427 DOCREV CUR MEDS BY ELIG CLIN: HCPCS | Performed by: INTERNAL MEDICINE

## 2019-09-17 PROCEDURE — 4004F PT TOBACCO SCREEN RCVD TLK: CPT | Performed by: INTERNAL MEDICINE

## 2019-09-17 PROCEDURE — G8417 CALC BMI ABV UP PARAM F/U: HCPCS | Performed by: INTERNAL MEDICINE

## 2019-09-17 PROCEDURE — 99213 OFFICE O/P EST LOW 20 MIN: CPT | Performed by: INTERNAL MEDICINE

## 2019-09-17 PROCEDURE — 3017F COLORECTAL CA SCREEN DOC REV: CPT | Performed by: INTERNAL MEDICINE

## 2019-09-17 RX ORDER — CLONAZEPAM 0.5 MG/1
0.5 TABLET ORAL NIGHTLY PRN
Qty: 30 TABLET | Refills: 0 | Status: SHIPPED | OUTPATIENT
Start: 2019-09-17 | End: 2019-10-24 | Stop reason: SDUPTHER

## 2019-09-17 RX ORDER — AMITRIPTYLINE HYDROCHLORIDE 10 MG/1
TABLET, FILM COATED ORAL
Qty: 30 TABLET | Refills: 1 | Status: SHIPPED | OUTPATIENT
Start: 2019-09-17 | End: 2019-12-02 | Stop reason: SDUPTHER

## 2019-09-17 RX ORDER — ROSUVASTATIN CALCIUM 20 MG/1
TABLET, COATED ORAL
Qty: 30 TABLET | Refills: 3 | Status: SHIPPED | OUTPATIENT
Start: 2019-09-17 | End: 2019-12-18

## 2019-09-17 ASSESSMENT — ENCOUNTER SYMPTOMS
VOMITING: 0
SHORTNESS OF BREATH: 0
EYE DISCHARGE: 0
BACK PAIN: 1
COUGH: 0
ABDOMINAL PAIN: 0
WHEEZING: 0
SINUS PRESSURE: 0
SORE THROAT: 0
NAUSEA: 0

## 2019-09-17 NOTE — PROGRESS NOTES
BP Readings from Last 3 Encounters:   09/17/19 102/70   06/05/19 118/70   04/04/19 138/72       /70 (Site: Right Upper Arm, Position: Sitting, Cuff Size: Medium Adult)   Pulse 98   Resp 18   Wt 165 lb 9.6 oz (75.1 kg)   LMP 01/01/2011   SpO2 95%   BMI 26.73 kg/m²      Physical Exam   Constitutional: She is oriented to person, place, and time. She appears well-developed and well-nourished. HENT:   Head: Normocephalic and atraumatic. Right Ear: External ear normal.   Left Ear: External ear normal.   Nose: Nose normal.   Eyes: Pupils are equal, round, and reactive to light. Conjunctivae and EOM are normal.   Neck: Normal range of motion. Neck supple. No JVD present. No thyromegaly present. Cardiovascular: Normal rate, regular rhythm and normal heart sounds. Pulmonary/Chest: Effort normal and breath sounds normal. She has no wheezes. She has no rales. Abdominal: Soft. Bowel sounds are normal. She exhibits no distension. There is no tenderness. Musculoskeletal: She exhibits no edema or tenderness. Cervical back: She exhibits decreased range of motion. She exhibits no tenderness and no spasm. Neurological: She is alert and oriented to person, place, and time. No cranial nerve deficit. Skin: No rash noted. No erythema. Psychiatric: She has a normal mood and affect. Her behavior is normal. Judgment and thought content normal.   Nursing note and vitals reviewed.       Lab Results   Component Value Date     05/07/2019    K 4.9 05/07/2019     05/07/2019    .0 05/22/2012    CO2 25 05/07/2019    GLUCOSE 100 05/07/2019    BUN 18 05/07/2019    CREATININE 1.3 05/07/2019    CREATININE 0.8 05/22/2012    CALCIUM 9.4 05/07/2019    PROT 7.4 07/18/2018    LABALBU 4.2 05/07/2019    LABALBU 4.3 05/22/2012    BILITOT 0.2 05/07/2019    ALT 29 05/07/2019    AST 23 05/07/2019       Hemoglobin A1C (%)   Date Value   11/09/2015 5.4     Microscopic Examination (no units)   Date Value

## 2019-10-24 DIAGNOSIS — F41.9 ANXIETY: ICD-10-CM

## 2019-10-24 DIAGNOSIS — F32.A DEPRESSION, UNSPECIFIED DEPRESSION TYPE: ICD-10-CM

## 2019-10-24 RX ORDER — CLONAZEPAM 0.5 MG/1
0.5 TABLET ORAL NIGHTLY PRN
Qty: 30 TABLET | Refills: 0 | Status: SHIPPED | OUTPATIENT
Start: 2019-10-24 | End: 2019-11-18 | Stop reason: SDUPTHER

## 2019-10-30 ENCOUNTER — PATIENT MESSAGE (OUTPATIENT)
Dept: PRIMARY CARE CLINIC | Age: 59
End: 2019-10-30

## 2019-10-30 DIAGNOSIS — M50.30 DEGENERATIVE DISC DISEASE, CERVICAL: ICD-10-CM

## 2019-10-30 DIAGNOSIS — I10 ESSENTIAL HYPERTENSION: ICD-10-CM

## 2019-10-30 DIAGNOSIS — E78.5 HYPERLIPIDEMIA, UNSPECIFIED HYPERLIPIDEMIA TYPE: ICD-10-CM

## 2019-10-30 DIAGNOSIS — M48.02 CERVICAL SPINAL STENOSIS: ICD-10-CM

## 2019-10-30 RX ORDER — FOLIC ACID 1 MG/1
TABLET ORAL
Qty: 30 TABLET | Refills: 5 | Status: SHIPPED | OUTPATIENT
Start: 2019-10-30 | End: 2019-11-18

## 2019-11-15 DIAGNOSIS — R92.8 ABNORMAL MAMMOGRAM: Primary | ICD-10-CM

## 2019-11-16 DIAGNOSIS — F41.9 ANXIETY: ICD-10-CM

## 2019-11-16 DIAGNOSIS — F32.A DEPRESSION, UNSPECIFIED DEPRESSION TYPE: ICD-10-CM

## 2019-11-16 RX ORDER — VENLAFAXINE HYDROCHLORIDE 150 MG/1
CAPSULE, EXTENDED RELEASE ORAL
Qty: 30 CAPSULE | Refills: 1 | Status: SHIPPED | OUTPATIENT
Start: 2019-11-16

## 2019-11-18 ENCOUNTER — OFFICE VISIT (OUTPATIENT)
Dept: PRIMARY CARE CLINIC | Age: 59
End: 2019-11-18
Payer: MEDICAID

## 2019-11-18 VITALS
OXYGEN SATURATION: 97 % | HEIGHT: 66 IN | HEART RATE: 117 BPM | SYSTOLIC BLOOD PRESSURE: 104 MMHG | DIASTOLIC BLOOD PRESSURE: 68 MMHG | WEIGHT: 164.2 LBS | BODY MASS INDEX: 26.39 KG/M2 | RESPIRATION RATE: 16 BRPM

## 2019-11-18 DIAGNOSIS — F32.A DEPRESSION, UNSPECIFIED DEPRESSION TYPE: ICD-10-CM

## 2019-11-18 DIAGNOSIS — M48.02 CERVICAL SPINAL STENOSIS: ICD-10-CM

## 2019-11-18 DIAGNOSIS — F41.9 ANXIETY: ICD-10-CM

## 2019-11-18 DIAGNOSIS — M50.30 DEGENERATIVE DISC DISEASE, CERVICAL: Primary | ICD-10-CM

## 2019-11-18 DIAGNOSIS — E78.5 HYPERLIPIDEMIA, UNSPECIFIED HYPERLIPIDEMIA TYPE: ICD-10-CM

## 2019-11-18 PROCEDURE — 99213 OFFICE O/P EST LOW 20 MIN: CPT | Performed by: INTERNAL MEDICINE

## 2019-11-18 PROCEDURE — 4004F PT TOBACCO SCREEN RCVD TLK: CPT | Performed by: INTERNAL MEDICINE

## 2019-11-18 PROCEDURE — G9899 SCRN MAM PERF RSLTS DOC: HCPCS | Performed by: INTERNAL MEDICINE

## 2019-11-18 PROCEDURE — G8484 FLU IMMUNIZE NO ADMIN: HCPCS | Performed by: INTERNAL MEDICINE

## 2019-11-18 PROCEDURE — G8427 DOCREV CUR MEDS BY ELIG CLIN: HCPCS | Performed by: INTERNAL MEDICINE

## 2019-11-18 PROCEDURE — G8417 CALC BMI ABV UP PARAM F/U: HCPCS | Performed by: INTERNAL MEDICINE

## 2019-11-18 PROCEDURE — 3017F COLORECTAL CA SCREEN DOC REV: CPT | Performed by: INTERNAL MEDICINE

## 2019-11-18 RX ORDER — GABAPENTIN 600 MG/1
600 TABLET ORAL 3 TIMES DAILY
Qty: 90 TABLET | Refills: 1 | Status: SHIPPED | OUTPATIENT
Start: 2019-11-18 | End: 2020-01-13 | Stop reason: SDUPTHER

## 2019-11-18 RX ORDER — CLONAZEPAM 0.5 MG/1
0.5 TABLET ORAL NIGHTLY PRN
Qty: 30 TABLET | Refills: 0 | Status: SHIPPED | OUTPATIENT
Start: 2019-11-18 | End: 2019-12-23

## 2019-11-18 ASSESSMENT — ENCOUNTER SYMPTOMS
COUGH: 0
VOMITING: 0
WHEEZING: 0
SINUS PRESSURE: 0
ABDOMINAL PAIN: 0
EYE DISCHARGE: 0
BACK PAIN: 1
SORE THROAT: 0
SHORTNESS OF BREATH: 0
NAUSEA: 0

## 2019-11-19 RX ORDER — TRAZODONE HYDROCHLORIDE 50 MG/1
50 TABLET ORAL NIGHTLY
Qty: 30 TABLET | Refills: 2 | Status: SHIPPED | OUTPATIENT
Start: 2019-11-19

## 2019-12-02 RX ORDER — AMITRIPTYLINE HYDROCHLORIDE 10 MG/1
TABLET, FILM COATED ORAL
Qty: 30 TABLET | Refills: 1 | Status: SHIPPED | OUTPATIENT
Start: 2019-12-02

## 2019-12-18 DIAGNOSIS — E78.5 HYPERLIPIDEMIA, UNSPECIFIED HYPERLIPIDEMIA TYPE: ICD-10-CM

## 2019-12-18 RX ORDER — ROSUVASTATIN CALCIUM 20 MG/1
TABLET, COATED ORAL
Qty: 30 TABLET | Refills: 3 | Status: SHIPPED | OUTPATIENT
Start: 2019-12-18

## 2019-12-19 DIAGNOSIS — F32.A DEPRESSION, UNSPECIFIED DEPRESSION TYPE: ICD-10-CM

## 2019-12-19 DIAGNOSIS — F41.9 ANXIETY: ICD-10-CM

## 2019-12-20 RX ORDER — CLONAZEPAM 0.5 MG/1
0.5 TABLET ORAL NIGHTLY PRN
Qty: 30 TABLET | Refills: 0 | OUTPATIENT
Start: 2019-12-20 | End: 2020-01-19

## 2020-01-08 ENCOUNTER — TELEPHONE (OUTPATIENT)
Dept: PRIMARY CARE CLINIC | Age: 60
End: 2020-01-08

## 2020-01-08 NOTE — TELEPHONE ENCOUNTER
Called pt in for pill count today she states \" I don't have a way I told  the last time I was there I didn't have a way to come and he said we will take care of that for you. \" Pt notified that failure to show up for pill count could result in no more controlled prescriptions she vu states she has an apt with you tomorrow and she will bring them then.

## 2020-01-09 ENCOUNTER — OFFICE VISIT (OUTPATIENT)
Dept: PRIMARY CARE CLINIC | Age: 60
End: 2020-01-09
Payer: MEDICAID

## 2020-01-09 VITALS
SYSTOLIC BLOOD PRESSURE: 124 MMHG | HEART RATE: 99 BPM | RESPIRATION RATE: 18 BRPM | WEIGHT: 168 LBS | OXYGEN SATURATION: 97 % | BODY MASS INDEX: 27.12 KG/M2 | DIASTOLIC BLOOD PRESSURE: 64 MMHG

## 2020-01-09 PROCEDURE — 4004F PT TOBACCO SCREEN RCVD TLK: CPT | Performed by: INTERNAL MEDICINE

## 2020-01-09 PROCEDURE — G8417 CALC BMI ABV UP PARAM F/U: HCPCS | Performed by: INTERNAL MEDICINE

## 2020-01-09 PROCEDURE — 3017F COLORECTAL CA SCREEN DOC REV: CPT | Performed by: INTERNAL MEDICINE

## 2020-01-09 PROCEDURE — G8484 FLU IMMUNIZE NO ADMIN: HCPCS | Performed by: INTERNAL MEDICINE

## 2020-01-09 PROCEDURE — G8427 DOCREV CUR MEDS BY ELIG CLIN: HCPCS | Performed by: INTERNAL MEDICINE

## 2020-01-09 PROCEDURE — G9899 SCRN MAM PERF RSLTS DOC: HCPCS | Performed by: INTERNAL MEDICINE

## 2020-01-09 PROCEDURE — 99213 OFFICE O/P EST LOW 20 MIN: CPT | Performed by: INTERNAL MEDICINE

## 2020-01-09 RX ORDER — TRAMADOL HYDROCHLORIDE 50 MG/1
50 TABLET ORAL EVERY 6 HOURS PRN
Qty: 12 TABLET | Refills: 0 | Status: SHIPPED | OUTPATIENT
Start: 2020-01-09 | End: 2020-01-12

## 2020-01-09 RX ORDER — FOLIC ACID 1 MG/1
TABLET ORAL DAILY
COMMUNITY
Start: 2019-12-17

## 2020-01-09 RX ORDER — TIZANIDINE 4 MG/1
4 TABLET ORAL EVERY 8 HOURS PRN
Qty: 45 TABLET | Refills: 0 | Status: SHIPPED | OUTPATIENT
Start: 2020-01-09

## 2020-01-09 ASSESSMENT — ENCOUNTER SYMPTOMS
SHORTNESS OF BREATH: 0
COUGH: 0
EYE DISCHARGE: 0
ABDOMINAL PAIN: 0
SINUS PRESSURE: 0
SORE THROAT: 0
WHEEZING: 0
BACK PAIN: 1
NAUSEA: 0
VOMITING: 0

## 2020-01-09 ASSESSMENT — PATIENT HEALTH QUESTIONNAIRE - PHQ9
1. LITTLE INTEREST OR PLEASURE IN DOING THINGS: 1
SUM OF ALL RESPONSES TO PHQ9 QUESTIONS 1 & 2: 2
SUM OF ALL RESPONSES TO PHQ QUESTIONS 1-9: 2
2. FEELING DOWN, DEPRESSED OR HOPELESS: 1
SUM OF ALL RESPONSES TO PHQ QUESTIONS 1-9: 2

## 2020-01-09 NOTE — PROGRESS NOTES
SUBJECTIVE:    Patient ID: Molly Figueroa is a 64 y. o.female. Chief Complaint   Patient presents with    Back Pain     spasms      HPI:  Patient has been having pain and spasms in her lower back that is worse than usual for the past few days. She has not been taking anything for this other than OTC medications. This provides very little relief. . Her pain radiates down her right leg. No tingling or numbness. She does also take Gabapentin regularly but it does not seem to be helping her pain in this area. Patient's medications, allergies, past medical, surgical, social and family histories were reviewed and updated as appropriate in the electronic medical record/chart. Outpatient Medications Marked as Taking for the 1/9/20 encounter (Office Visit) with Tamiko Jason MD   Medication Sig Dispense Refill    folic acid (FOLVITE) 1 MG tablet daily      clonazePAM (KLONOPIN) 0.5 MG tablet TAKE 1 TABLET BY MOUTH NIGHTLY AS NEEDED FOR ANXIETY 30 tablet 0    rosuvastatin (CRESTOR) 20 MG tablet TAKE 1 TABLET BY MOUTH EVERY DAY 30 tablet 3    amitriptyline (ELAVIL) 10 MG tablet TAKE 1 TABLET BY MOUTH NIGHTLY AS NEEDED 30 tablet 1    traZODone (DESYREL) 50 MG tablet Take 1 tablet by mouth nightly 30 tablet 2    gabapentin (NEURONTIN) 600 MG tablet Take 1 tablet by mouth 3 times daily for 61 days. 90 tablet 1    venlafaxine (EFFEXOR XR) 150 MG extended release capsule TAKE 1 CAPSULE BY MOUTH EVERY DAY 30 capsule 1    aspirin 325 MG EC tablet TAKE 1 TABLET BY MOUTH EVERY DAY 30 tablet 0    pantoprazole (PROTONIX) 40 MG tablet TAKE ONE TABLET BY MOUTH 2 TIMES A DAY (BEFORE MEALS) 60 tablet 5    vitamin D ([employer]S D3) 5000 units CAPS capsule Take 1 capsule by mouth daily 90 capsule 2        Review of Systems   Constitutional: Negative for chills and fever. HENT: Negative for congestion, sinus pressure and sore throat. Eyes: Negative for discharge and visual disturbance.    Respiratory: Negative for cough,

## 2020-01-13 RX ORDER — GABAPENTIN 600 MG/1
600 TABLET ORAL 3 TIMES DAILY
Qty: 90 TABLET | Refills: 0 | Status: SHIPPED | OUTPATIENT
Start: 2020-01-13 | End: 2020-03-14

## 2020-01-16 ENCOUNTER — TELEPHONE (OUTPATIENT)
Dept: PRIMARY CARE CLINIC | Age: 60
End: 2020-01-16

## 2020-02-07 RX ORDER — TRAZODONE HYDROCHLORIDE 50 MG/1
TABLET ORAL
Qty: 30 TABLET | Refills: 2 | OUTPATIENT
Start: 2020-02-07

## 2020-02-21 RX ORDER — PANTOPRAZOLE SODIUM 40 MG/1
TABLET, DELAYED RELEASE ORAL
Qty: 60 TABLET | Refills: 0 | Status: SHIPPED | OUTPATIENT
Start: 2020-02-21

## 2020-03-11 ENCOUNTER — APPOINTMENT (OUTPATIENT)
Dept: CT IMAGING | Facility: HOSPITAL | Age: 60
End: 2020-03-11

## 2020-03-11 ENCOUNTER — HOSPITAL ENCOUNTER (OUTPATIENT)
Facility: HOSPITAL | Age: 60
Setting detail: OBSERVATION
Discharge: HOME | End: 2020-03-15
Attending: EMERGENCY MEDICINE | Admitting: HOSPITALIST

## 2020-03-11 DIAGNOSIS — Z59.00 HOMELESSNESS: ICD-10-CM

## 2020-03-11 DIAGNOSIS — M54.2 CHRONIC NECK PAIN: ICD-10-CM

## 2020-03-11 DIAGNOSIS — Z87.39 HISTORY OF BACK PAIN: ICD-10-CM

## 2020-03-11 DIAGNOSIS — G89.29 CHRONIC NECK PAIN: ICD-10-CM

## 2020-03-11 DIAGNOSIS — I95.9 HYPOTENSION, UNSPECIFIED HYPOTENSION TYPE: ICD-10-CM

## 2020-03-11 DIAGNOSIS — R55 SYNCOPE AND COLLAPSE: ICD-10-CM

## 2020-03-11 DIAGNOSIS — J10.1 INFLUENZA A: Primary | ICD-10-CM

## 2020-03-11 DIAGNOSIS — Z87.09 HISTORY OF COPD: ICD-10-CM

## 2020-03-11 PROBLEM — N18.30 STAGE 3 CHRONIC KIDNEY DISEASE (HCC): Status: ACTIVE | Noted: 2020-03-11

## 2020-03-11 LAB
ALBUMIN SERPL-MCNC: 4.1 G/DL (ref 3.5–5.2)
ALBUMIN/GLOB SERPL: 1.5 G/DL
ALP SERPL-CCNC: 86 U/L (ref 39–117)
ALT SERPL W P-5'-P-CCNC: 51 U/L (ref 1–33)
ANION GAP SERPL CALCULATED.3IONS-SCNC: 16 MMOL/L (ref 5–15)
AST SERPL-CCNC: 73 U/L (ref 1–32)
B PARAPERT DNA SPEC QL NAA+PROBE: NOT DETECTED
B PERT DNA SPEC QL NAA+PROBE: NOT DETECTED
BACTERIA UR QL AUTO: ABNORMAL /HPF
BASOPHILS # BLD AUTO: 0.02 10*3/MM3 (ref 0–0.2)
BASOPHILS NFR BLD AUTO: 0.4 % (ref 0–1.5)
BILIRUB SERPL-MCNC: 0.4 MG/DL (ref 0.2–1.2)
BILIRUB UR QL STRIP: NEGATIVE
BUN BLD-MCNC: 35 MG/DL (ref 6–20)
BUN/CREAT SERPL: 20.2 (ref 7–25)
C PNEUM DNA NPH QL NAA+NON-PROBE: NOT DETECTED
CALCIUM SPEC-SCNC: 8.6 MG/DL (ref 8.6–10.5)
CHLORIDE SERPL-SCNC: 97 MMOL/L (ref 98–107)
CLARITY UR: CLEAR
CO2 SERPL-SCNC: 23 MMOL/L (ref 22–29)
COLOR UR: YELLOW
CREAT BLD-MCNC: 1.73 MG/DL (ref 0.57–1)
D DIMER PPP FEU-MCNC: 0.74 MCGFEU/ML (ref 0–0.56)
D-LACTATE SERPL-SCNC: 1.3 MMOL/L (ref 0.5–2)
DEPRECATED RDW RBC AUTO: 52 FL (ref 37–54)
EOSINOPHIL # BLD AUTO: 0 10*3/MM3 (ref 0–0.4)
EOSINOPHIL NFR BLD AUTO: 0 % (ref 0.3–6.2)
ERYTHROCYTE [DISTWIDTH] IN BLOOD BY AUTOMATED COUNT: 14.5 % (ref 12.3–15.4)
FLUAV H1 2009 PAND RNA NPH QL NAA+PROBE: DETECTED
FLUAV H1 HA GENE NPH QL NAA+PROBE: NOT DETECTED
FLUAV H3 RNA NPH QL NAA+PROBE: NOT DETECTED
FLUBV RNA ISLT QL NAA+PROBE: NOT DETECTED
GFR SERPL CREATININE-BSD FRML MDRD: 30 ML/MIN/1.73
GLOBULIN UR ELPH-MCNC: 2.8 GM/DL
GLUCOSE BLD-MCNC: 100 MG/DL (ref 65–99)
GLUCOSE UR STRIP-MCNC: NEGATIVE MG/DL
HADV DNA SPEC NAA+PROBE: NOT DETECTED
HCOV 229E RNA SPEC QL NAA+PROBE: NOT DETECTED
HCOV HKU1 RNA SPEC QL NAA+PROBE: NOT DETECTED
HCOV NL63 RNA SPEC QL NAA+PROBE: NOT DETECTED
HCOV OC43 RNA SPEC QL NAA+PROBE: NOT DETECTED
HCT VFR BLD AUTO: 38.9 % (ref 34–46.6)
HGB BLD-MCNC: 11.9 G/DL (ref 12–15.9)
HGB UR QL STRIP.AUTO: NEGATIVE
HMPV RNA NPH QL NAA+NON-PROBE: NOT DETECTED
HOLD SPECIMEN: NORMAL
HOLD SPECIMEN: NORMAL
HPIV1 RNA SPEC QL NAA+PROBE: NOT DETECTED
HPIV2 RNA SPEC QL NAA+PROBE: NOT DETECTED
HPIV3 RNA NPH QL NAA+PROBE: NOT DETECTED
HPIV4 P GENE NPH QL NAA+PROBE: NOT DETECTED
HYALINE CASTS UR QL AUTO: ABNORMAL /LPF
IMM GRANULOCYTES # BLD AUTO: 0.01 10*3/MM3 (ref 0–0.05)
IMM GRANULOCYTES NFR BLD AUTO: 0.2 % (ref 0–0.5)
KETONES UR QL STRIP: ABNORMAL
LEUKOCYTE ESTERASE UR QL STRIP.AUTO: ABNORMAL
LYMPHOCYTES # BLD AUTO: 0.53 10*3/MM3 (ref 0.7–3.1)
LYMPHOCYTES NFR BLD AUTO: 11.6 % (ref 19.6–45.3)
M PNEUMO IGG SER IA-ACNC: NOT DETECTED
MAGNESIUM SERPL-MCNC: 2 MG/DL (ref 1.6–2.6)
MCH RBC QN AUTO: 29.6 PG (ref 26.6–33)
MCHC RBC AUTO-ENTMCNC: 30.6 G/DL (ref 31.5–35.7)
MCV RBC AUTO: 96.8 FL (ref 79–97)
MONOCYTES # BLD AUTO: 0.54 10*3/MM3 (ref 0.1–0.9)
MONOCYTES NFR BLD AUTO: 11.8 % (ref 5–12)
NEUTROPHILS # BLD AUTO: 3.48 10*3/MM3 (ref 1.7–7)
NEUTROPHILS NFR BLD AUTO: 76 % (ref 42.7–76)
NITRITE UR QL STRIP: NEGATIVE
NRBC BLD AUTO-RTO: 0 /100 WBC (ref 0–0.2)
NT-PROBNP SERPL-MCNC: 425 PG/ML (ref 5–900)
PH UR STRIP.AUTO: 6.5 [PH] (ref 5–8)
PLATELET # BLD AUTO: 150 10*3/MM3 (ref 140–450)
PMV BLD AUTO: 12.1 FL (ref 6–12)
POTASSIUM BLD-SCNC: 4 MMOL/L (ref 3.5–5.2)
PROCALCITONIN SERPL-MCNC: 0.43 NG/ML (ref 0.1–0.25)
PROT SERPL-MCNC: 6.9 G/DL (ref 6–8.5)
PROT UR QL STRIP: ABNORMAL
RBC # BLD AUTO: 4.02 10*6/MM3 (ref 3.77–5.28)
RBC # UR: ABNORMAL /HPF
REF LAB TEST METHOD: ABNORMAL
RHINOVIRUS RNA SPEC NAA+PROBE: NOT DETECTED
RSV RNA NPH QL NAA+NON-PROBE: NOT DETECTED
SODIUM BLD-SCNC: 136 MMOL/L (ref 136–145)
SP GR UR STRIP: 1.02 (ref 1–1.03)
SQUAMOUS #/AREA URNS HPF: ABNORMAL /HPF
TROPONIN T SERPL-MCNC: <0.01 NG/ML (ref 0–0.03)
UROBILINOGEN UR QL STRIP: ABNORMAL
WBC CASTS #/AREA URNS LPF: ABNORMAL /LPF
WBC NRBC COR # BLD: 4.58 10*3/MM3 (ref 3.4–10.8)
WBC UR QL AUTO: ABNORMAL /HPF
WHOLE BLOOD HOLD SPECIMEN: NORMAL
WHOLE BLOOD HOLD SPECIMEN: NORMAL

## 2020-03-11 PROCEDURE — 25010000002 AZITHROMYCIN PER 500 MG: Performed by: NURSE PRACTITIONER

## 2020-03-11 PROCEDURE — 85007 BL SMEAR W/DIFF WBC COUNT: CPT

## 2020-03-11 PROCEDURE — 93005 ELECTROCARDIOGRAM TRACING: CPT

## 2020-03-11 PROCEDURE — 99220 PR INITIAL OBSERVATION CARE/DAY 70 MINUTES: CPT | Performed by: HOSPITALIST

## 2020-03-11 PROCEDURE — 25010000002 HEPARIN (PORCINE) PER 1000 UNITS: Performed by: NURSE PRACTITIONER

## 2020-03-11 PROCEDURE — 71250 CT THORAX DX C-: CPT

## 2020-03-11 PROCEDURE — 25010000002 CEFTRIAXONE PER 250 MG: Performed by: NURSE PRACTITIONER

## 2020-03-11 PROCEDURE — 96367 TX/PROPH/DG ADDL SEQ IV INF: CPT

## 2020-03-11 PROCEDURE — 0100U HC BIOFIRE FILMARRAY RESP PANEL 2: CPT | Performed by: NURSE PRACTITIONER

## 2020-03-11 PROCEDURE — 70450 CT HEAD/BRAIN W/O DYE: CPT

## 2020-03-11 PROCEDURE — 84145 PROCALCITONIN (PCT): CPT | Performed by: NURSE PRACTITIONER

## 2020-03-11 PROCEDURE — G0378 HOSPITAL OBSERVATION PER HR: HCPCS

## 2020-03-11 PROCEDURE — P9612 CATHETERIZE FOR URINE SPEC: HCPCS

## 2020-03-11 PROCEDURE — 84484 ASSAY OF TROPONIN QUANT: CPT

## 2020-03-11 PROCEDURE — 96372 THER/PROPH/DIAG INJ SC/IM: CPT

## 2020-03-11 PROCEDURE — 99285 EMERGENCY DEPT VISIT HI MDM: CPT

## 2020-03-11 PROCEDURE — 85025 COMPLETE CBC W/AUTO DIFF WBC: CPT

## 2020-03-11 PROCEDURE — 94799 UNLISTED PULMONARY SVC/PX: CPT

## 2020-03-11 PROCEDURE — 83735 ASSAY OF MAGNESIUM: CPT

## 2020-03-11 PROCEDURE — 96361 HYDRATE IV INFUSION ADD-ON: CPT

## 2020-03-11 PROCEDURE — 96365 THER/PROPH/DIAG IV INF INIT: CPT

## 2020-03-11 PROCEDURE — 81001 URINALYSIS AUTO W/SCOPE: CPT | Performed by: EMERGENCY MEDICINE

## 2020-03-11 PROCEDURE — 83880 ASSAY OF NATRIURETIC PEPTIDE: CPT | Performed by: NURSE PRACTITIONER

## 2020-03-11 PROCEDURE — 83605 ASSAY OF LACTIC ACID: CPT | Performed by: NURSE PRACTITIONER

## 2020-03-11 PROCEDURE — 87086 URINE CULTURE/COLONY COUNT: CPT | Performed by: EMERGENCY MEDICINE

## 2020-03-11 PROCEDURE — 87040 BLOOD CULTURE FOR BACTERIA: CPT | Performed by: NURSE PRACTITIONER

## 2020-03-11 PROCEDURE — 74176 CT ABD & PELVIS W/O CONTRAST: CPT

## 2020-03-11 PROCEDURE — 85379 FIBRIN DEGRADATION QUANT: CPT | Performed by: NURSE PRACTITIONER

## 2020-03-11 PROCEDURE — 80053 COMPREHEN METABOLIC PANEL: CPT

## 2020-03-11 RX ORDER — ONDANSETRON 2 MG/ML
4 INJECTION INTRAMUSCULAR; INTRAVENOUS EVERY 6 HOURS PRN
Status: DISCONTINUED | OUTPATIENT
Start: 2020-03-11 | End: 2020-03-15 | Stop reason: HOSPADM

## 2020-03-11 RX ORDER — TRAZODONE HYDROCHLORIDE 50 MG/1
25 TABLET ORAL NIGHTLY
Status: DISCONTINUED | OUTPATIENT
Start: 2020-03-11 | End: 2020-03-15 | Stop reason: HOSPADM

## 2020-03-11 RX ORDER — NICOTINE 21 MG/24HR
1 PATCH, TRANSDERMAL 24 HOURS TRANSDERMAL EVERY 24 HOURS
Status: DISCONTINUED | OUTPATIENT
Start: 2020-03-11 | End: 2020-03-15 | Stop reason: HOSPADM

## 2020-03-11 RX ORDER — POTASSIUM CHLORIDE 1.5 G/1.77G
40 POWDER, FOR SOLUTION ORAL AS NEEDED
Status: DISCONTINUED | OUTPATIENT
Start: 2020-03-11 | End: 2020-03-15 | Stop reason: HOSPADM

## 2020-03-11 RX ORDER — TRAMADOL HYDROCHLORIDE 50 MG/1
50 TABLET ORAL DAILY PRN
COMMUNITY
End: 2020-03-15 | Stop reason: HOSPADM

## 2020-03-11 RX ORDER — ALBUTEROL SULFATE 90 UG/1
2 AEROSOL, METERED RESPIRATORY (INHALATION) EVERY 4 HOURS PRN
COMMUNITY

## 2020-03-11 RX ORDER — PANTOPRAZOLE SODIUM 40 MG/1
40 TABLET, DELAYED RELEASE ORAL
COMMUNITY

## 2020-03-11 RX ORDER — AMITRIPTYLINE HYDROCHLORIDE 10 MG/1
10 TABLET, FILM COATED ORAL NIGHTLY
COMMUNITY

## 2020-03-11 RX ORDER — TIZANIDINE 4 MG/1
4 TABLET ORAL NIGHTLY PRN
COMMUNITY
End: 2020-03-15 | Stop reason: HOSPADM

## 2020-03-11 RX ORDER — MAGNESIUM SULFATE 1 G/100ML
1 INJECTION INTRAVENOUS AS NEEDED
Status: DISCONTINUED | OUTPATIENT
Start: 2020-03-11 | End: 2020-03-15 | Stop reason: HOSPADM

## 2020-03-11 RX ORDER — SODIUM CHLORIDE 0.9 % (FLUSH) 0.9 %
10 SYRINGE (ML) INJECTION EVERY 12 HOURS SCHEDULED
Status: DISCONTINUED | OUTPATIENT
Start: 2020-03-11 | End: 2020-03-15 | Stop reason: HOSPADM

## 2020-03-11 RX ORDER — POTASSIUM CHLORIDE 7.45 MG/ML
10 INJECTION INTRAVENOUS
Status: DISCONTINUED | OUTPATIENT
Start: 2020-03-11 | End: 2020-03-15 | Stop reason: HOSPADM

## 2020-03-11 RX ORDER — ACETAMINOPHEN 160 MG/5ML
650 SOLUTION ORAL EVERY 4 HOURS PRN
Status: DISCONTINUED | OUTPATIENT
Start: 2020-03-11 | End: 2020-03-12

## 2020-03-11 RX ORDER — FOLIC ACID 1 MG/1
1 TABLET ORAL DAILY
COMMUNITY

## 2020-03-11 RX ORDER — ACETAMINOPHEN 650 MG/1
650 SUPPOSITORY RECTAL EVERY 4 HOURS PRN
Status: DISCONTINUED | OUTPATIENT
Start: 2020-03-11 | End: 2020-03-12

## 2020-03-11 RX ORDER — ALBUTEROL SULFATE 2.5 MG/3ML
2.5 SOLUTION RESPIRATORY (INHALATION) EVERY 6 HOURS PRN
Status: DISCONTINUED | OUTPATIENT
Start: 2020-03-11 | End: 2020-03-15 | Stop reason: HOSPADM

## 2020-03-11 RX ORDER — HEPARIN SODIUM 5000 [USP'U]/ML
5000 INJECTION, SOLUTION INTRAVENOUS; SUBCUTANEOUS EVERY 12 HOURS SCHEDULED
Status: DISCONTINUED | OUTPATIENT
Start: 2020-03-11 | End: 2020-03-15 | Stop reason: HOSPADM

## 2020-03-11 RX ORDER — GABAPENTIN 600 MG/1
600 TABLET ORAL 3 TIMES DAILY
COMMUNITY
End: 2020-03-15 | Stop reason: HOSPADM

## 2020-03-11 RX ORDER — HYDROCODONE BITARTRATE AND ACETAMINOPHEN 5; 325 MG/1; MG/1
1 TABLET ORAL EVERY 6 HOURS PRN
COMMUNITY
End: 2020-03-15 | Stop reason: HOSPADM

## 2020-03-11 RX ORDER — MAGNESIUM SULFATE HEPTAHYDRATE 40 MG/ML
4 INJECTION, SOLUTION INTRAVENOUS AS NEEDED
Status: DISCONTINUED | OUTPATIENT
Start: 2020-03-11 | End: 2020-03-15 | Stop reason: HOSPADM

## 2020-03-11 RX ORDER — POTASSIUM CHLORIDE 750 MG/1
40 CAPSULE, EXTENDED RELEASE ORAL AS NEEDED
Status: DISCONTINUED | OUTPATIENT
Start: 2020-03-11 | End: 2020-03-15 | Stop reason: HOSPADM

## 2020-03-11 RX ORDER — IPRATROPIUM BROMIDE AND ALBUTEROL SULFATE 2.5; .5 MG/3ML; MG/3ML
3 SOLUTION RESPIRATORY (INHALATION)
Status: DISCONTINUED | OUTPATIENT
Start: 2020-03-11 | End: 2020-03-13

## 2020-03-11 RX ORDER — ACETAMINOPHEN 325 MG/1
650 TABLET ORAL EVERY 4 HOURS PRN
Status: DISCONTINUED | OUTPATIENT
Start: 2020-03-11 | End: 2020-03-12

## 2020-03-11 RX ORDER — FOLIC ACID 1 MG/1
1 TABLET ORAL DAILY
Status: DISCONTINUED | OUTPATIENT
Start: 2020-03-11 | End: 2020-03-15 | Stop reason: HOSPADM

## 2020-03-11 RX ORDER — ASPIRIN 325 MG
325 TABLET ORAL DAILY
Status: DISCONTINUED | OUTPATIENT
Start: 2020-03-11 | End: 2020-03-15 | Stop reason: HOSPADM

## 2020-03-11 RX ORDER — SODIUM CHLORIDE 0.9 % (FLUSH) 0.9 %
10 SYRINGE (ML) INJECTION AS NEEDED
Status: DISCONTINUED | OUTPATIENT
Start: 2020-03-11 | End: 2020-03-15 | Stop reason: HOSPADM

## 2020-03-11 RX ORDER — VENLAFAXINE HYDROCHLORIDE 150 MG/1
150 CAPSULE, EXTENDED RELEASE ORAL DAILY
COMMUNITY

## 2020-03-11 RX ORDER — VENLAFAXINE HYDROCHLORIDE 75 MG/1
150 CAPSULE, EXTENDED RELEASE ORAL DAILY
Status: DISCONTINUED | OUTPATIENT
Start: 2020-03-11 | End: 2020-03-15 | Stop reason: HOSPADM

## 2020-03-11 RX ORDER — PANTOPRAZOLE SODIUM 40 MG/1
40 TABLET, DELAYED RELEASE ORAL
Status: DISCONTINUED | OUTPATIENT
Start: 2020-03-11 | End: 2020-03-15 | Stop reason: HOSPADM

## 2020-03-11 RX ORDER — L.ACID,PARA/B.BIFIDUM/S.THERM 8B CELL
1 CAPSULE ORAL DAILY
Status: DISCONTINUED | OUTPATIENT
Start: 2020-03-11 | End: 2020-03-15 | Stop reason: HOSPADM

## 2020-03-11 RX ORDER — TRAZODONE HYDROCHLORIDE 50 MG/1
50 TABLET ORAL NIGHTLY
COMMUNITY
End: 2020-03-15 | Stop reason: HOSPADM

## 2020-03-11 RX ORDER — PROMETHAZINE HYDROCHLORIDE 25 MG/1
50 TABLET ORAL EVERY 6 HOURS PRN
COMMUNITY
End: 2020-03-15 | Stop reason: HOSPADM

## 2020-03-11 RX ORDER — TRAMADOL HYDROCHLORIDE 50 MG/1
50 TABLET ORAL EVERY 8 HOURS PRN
Status: DISCONTINUED | OUTPATIENT
Start: 2020-03-11 | End: 2020-03-12

## 2020-03-11 RX ORDER — DOCUSATE SODIUM 100 MG/1
100 CAPSULE, LIQUID FILLED ORAL 2 TIMES DAILY PRN
Status: DISCONTINUED | OUTPATIENT
Start: 2020-03-11 | End: 2020-03-12

## 2020-03-11 RX ORDER — SODIUM CHLORIDE 9 MG/ML
50 INJECTION, SOLUTION INTRAVENOUS CONTINUOUS
Status: DISCONTINUED | OUTPATIENT
Start: 2020-03-11 | End: 2020-03-15 | Stop reason: HOSPADM

## 2020-03-11 RX ORDER — ROSUVASTATIN CALCIUM 20 MG/1
20 TABLET, COATED ORAL DAILY
COMMUNITY

## 2020-03-11 RX ORDER — ROSUVASTATIN CALCIUM 20 MG/1
20 TABLET, COATED ORAL DAILY
Status: DISCONTINUED | OUTPATIENT
Start: 2020-03-11 | End: 2020-03-15 | Stop reason: HOSPADM

## 2020-03-11 RX ORDER — OSELTAMIVIR PHOSPHATE 30 MG/1
30 CAPSULE ORAL EVERY 12 HOURS SCHEDULED
Status: DISCONTINUED | OUTPATIENT
Start: 2020-03-11 | End: 2020-03-15 | Stop reason: HOSPADM

## 2020-03-11 RX ORDER — GABAPENTIN 300 MG/1
300 CAPSULE ORAL EVERY 8 HOURS SCHEDULED
Status: DISCONTINUED | OUTPATIENT
Start: 2020-03-11 | End: 2020-03-13

## 2020-03-11 RX ORDER — MAGNESIUM SULFATE HEPTAHYDRATE 40 MG/ML
2 INJECTION, SOLUTION INTRAVENOUS AS NEEDED
Status: DISCONTINUED | OUTPATIENT
Start: 2020-03-11 | End: 2020-03-15 | Stop reason: HOSPADM

## 2020-03-11 RX ADMIN — ROSUVASTATIN CALCIUM 20 MG: 20 TABLET, COATED ORAL at 18:14

## 2020-03-11 RX ADMIN — TRAZODONE HYDROCHLORIDE 25 MG: 50 TABLET ORAL at 20:59

## 2020-03-11 RX ADMIN — OSELTAMIVIR PHOSPHATE 30 MG: 30 CAPSULE ORAL at 20:58

## 2020-03-11 RX ADMIN — IPRATROPIUM BROMIDE AND ALBUTEROL SULFATE 3 ML: 2.5; .5 SOLUTION RESPIRATORY (INHALATION) at 19:39

## 2020-03-11 RX ADMIN — CEFTRIAXONE SODIUM 1 G: 1 INJECTION, POWDER, FOR SOLUTION INTRAMUSCULAR; INTRAVENOUS at 21:08

## 2020-03-11 RX ADMIN — GABAPENTIN 300 MG: 300 CAPSULE ORAL at 20:59

## 2020-03-11 RX ADMIN — SODIUM CHLORIDE, PRESERVATIVE FREE 10 ML: 5 INJECTION INTRAVENOUS at 20:59

## 2020-03-11 RX ADMIN — HEPARIN SODIUM 5000 UNITS: 5000 INJECTION, SOLUTION INTRAVENOUS; SUBCUTANEOUS at 20:58

## 2020-03-11 RX ADMIN — AZITHROMYCIN MONOHYDRATE 500 MG: 500 INJECTION, POWDER, LYOPHILIZED, FOR SOLUTION INTRAVENOUS at 18:50

## 2020-03-11 RX ADMIN — SODIUM CHLORIDE 2000 ML: 9 INJECTION, SOLUTION INTRAVENOUS at 12:41

## 2020-03-11 RX ADMIN — NICOTINE 1 PATCH: 21 PATCH, EXTENDED RELEASE TRANSDERMAL at 18:14

## 2020-03-11 RX ADMIN — SODIUM CHLORIDE 100 ML/HR: 9 INJECTION, SOLUTION INTRAVENOUS at 19:07

## 2020-03-11 RX ADMIN — SODIUM CHLORIDE 2000 ML: 9 INJECTION, SOLUTION INTRAVENOUS at 17:38

## 2020-03-11 RX ADMIN — PANTOPRAZOLE SODIUM 40 MG: 40 TABLET, DELAYED RELEASE ORAL at 18:14

## 2020-03-11 SDOH — ECONOMIC STABILITY - HOUSING INSECURITY: HOMELESSNESS UNSPECIFIED: Z59.00

## 2020-03-11 NOTE — H&P
[facility] Medicine Services  HISTORY AND PHYSICAL    Patient Name: Lexii Duckworth  : 1960  MRN: [id number]  Primary Care Physician: Fausto Valentin MD  Date of admission: 3/11/2020      Subjective   Subjective     Chief Complaint:  syncope    HPI:  Lexii Duckworth is a 59 y.o. female with history of fibromyalgia, hypertension, COPD, chronic kidney disease stage III, anxiety, depression, obstructive sleep apnea, CVA, CAD presenting to the ED by ambulance following a syncopal episode at [facility] earlier today. Pt found to be hypotensive with BP 77/48 HR 81. Patient reports chills, sweats, headache, dizziness, productive cough with yellow sputum, and decreased appetite for 3 days. She was positive for influenza A.     Review of Systems   Gen- No fevers, (+) chills, sweats  HENT- (+) headache, sore throat. (-) ear pain, sinus congestion  CV- No chest pain, palpitations  Resp- (+) productive cough, dyspnea  GI- No N/V/D, abd pain (+) decreased appetite  MS- (+) right upper leg pain (+) sternum pain with deep breathing and coughing  Neuro- (+) dizziness    All other systems reviewed and are negative.     Personal History     Past Medical History:   Diagnosis Date   • Anxiety    • Back pain    • Chronic kidney disease    • COPD (chronic obstructive pulmonary disease) (CMS/HCC)    • Depression    • Fibromyalgia    • Hyperlipidemia    • Hypertension    • Migraine headache    • Neck pain    • Osteoarthritis    • Stroke (CMS/HCC)     left hemispheric (cortical) stroke       Past Surgical History:   Procedure Laterality Date   • CAROTID ENDARTERECTOMY Left    • HAND SURGERY     • TUBAL ABDOMINAL LIGATION         Family History: family history includes COPD in her sister; Cancer in her father; Depression in her sister; Heart disease in her brother and sister; Mental illness in her sister; Other in an other family member; Seizures in her daughter. Otherwise pertinent FHx was reviewed and  unremarkable.     Social History:  reports that she has been smoking cigarettes. She has a 46.00 pack-year smoking history. She has never used smokeless tobacco. She reports that she does not drink alcohol or use drugs.  Social History     Social History Narrative   • Not on file     Medications:  Available home medication information reviewed.  No Known Allergies    Objective   Objective     Vital Signs:   Temp:  [98.6 °F (37 °C)] 98.6 °F (37 °C)  Heart Rate:  [66-91] 70  Resp:  [22] 22  BP: ()/(48-70) 93/67        Physical Exam   Constitutional: Awake, alert, appears ill  Eyes: PERRLA, sclerae anicteric, no conjunctival injection  HENT: NCAT, mucous membranes dry  Neck: Supple, no thyromegaly, no lymphadenopathy, trachea midline  Respiratory: occasional rhonchi, nonlabored respirations   Cardiovascular: RRR, no murmurs, rubs, or gallops, palpable pedal pulses bilaterally  Gastrointestinal: Positive bowel sounds, soft, nontender, nondistended  Musculoskeletal: No bilateral ankle edema, no clubbing or cyanosis to extremities  Psychiatric: Appropriate affect, cooperative  Neurologic: Oriented x 3, strength symmetric in all extremities, Cranial Nerves grossly intact to confrontation, speech clear  Skin: No rashes    Results Reviewed:  I have personally reviewed current lab and radiology data.    Results from last 7 days   Lab Units 03/11/20  1201   WBC 10*3/mm3 4.58   HEMOGLOBIN g/dL 11.9*   HEMATOCRIT % 38.9   PLATELETS 10*3/mm3 150     Results from last 7 days   Lab Units 03/11/20  1202 03/11/20  1201   SODIUM mmol/L  --  136   POTASSIUM mmol/L  --  4.0   CHLORIDE mmol/L  --  97*   CO2 mmol/L  --  23.0   BUN mg/dL  --  35*   CREATININE mg/dL  --  1.73*   GLUCOSE mg/dL  --  100*   CALCIUM mg/dL  --  8.6   ALT (SGPT) U/L  --  51*   AST (SGOT) U/L  --  73*   TROPONIN T ng/mL  --  <0.010   PROBNP pg/mL  --  425.0   LACTATE mmol/L 1.3  --    PROCALCITONIN ng/mL  --  0.43*     Estimated Creatinine Clearance: 35.1  mL/min (A) (by C-G formula based on SCr of 1.73 mg/dL (H)).  Brief Urine Lab Results  (Last result in the past 365 days)      Color   Clarity   Blood   Leuk Est   Nitrite   Protein   CREAT   Urine HCG        03/11/20 1150 Yellow Clear Negative Trace Negative 100 mg/dL (2+)             Imaging Results (Last 24 Hours)     Procedure Component Value Units Date/Time    CT Head Without Contrast [[id number]] Collected:  03/11/20 1348     Updated:  03/11/20 1633    Narrative:       EXAMINATION: CT HEAD WO CONTRAST-03/11/2020:      INDICATION: Syncope; gen weakness; hypotension.      TECHNIQUE: CT head without intravenous contrast.     The radiation dose reduction device was turned on for each scan per the  ALARA (As Low as Reasonably Achievable) protocol.     COMPARISON: NONE.     FINDINGS:  The midline structures are symmetric without evidence of  mass, mass effect or midline shift. Ventricles and sulci within normal  limits. No intra-axial hemorrhage or extra-axial fluid collection.  Globes and orbits are unremarkable. The visualized paranasal sinuses and  mastoid air cells are grossly clear and well pneumatized. Calvarium  intact.       Impression:       No acute intracranial abnormality.     D:  03/11/2020  E:  03/11/2020             CT Chest Without Contrast [[id number]] Collected:  03/11/20 1357     Updated:  03/11/20 1631    Narrative:       EXAMINATION: CT ABDOMEN/PELVIS WO CONTRAST, CT CHEST WO CONTRAST -  03/11/2020     INDICATION: Abdominal pain. Syncope.     TECHNIQUE: CT chest, abdomen and pelvis without intravenous contrast.     The radiation dose reduction device was turned on for each scan per the  ALARA (As Low as Reasonably Achievable) protocol.     COMPARISON: None.     FINDINGS:      CHEST: Thyroid is homogeneous in attenuation. No bulky mediastinal  adenopathy. Central airways are patent. Esophagus in normal course and  caliber. Atherosclerotic nonaneurysmal thoracic ureter. Cardiac size  within normal  limits and without pericardial effusion. Extended lung  windows demonstrate biapical pleural-parenchymal scarring with central  bronchiectasis and midlung scarring however no dominant nodule or mass.  Calcified granuloma right lung base along with area of adjacent scarring  and small noncalcified 4 mm likely inflammatory nodule without  consolidation or effusion. Degenerative changes of the spine without  aggressive osseous or soft tissue body wall findings demonstrate  scoliotic curvature of the spine.     ABDOMEN AND PELVIS: Liver is without focal lesion. Gallbladder  surgically absent. No biliary dilatation. Pancreas and spleen  unremarkable with splenule adjacent the anterolateral margin of the  spleen. Adrenals demonstrate bilateral adrenal nodules 1 cm right and  1.3 cm left with attenuation values consistent of lipid rich adenomas.  Kidneys without hydronephrosis or hydroureter. No bulky retroperitoneal  adenopathy. Atherosclerotic nonaneurysmal abdominal aorta. GI tract  evaluation without focal thickening or disproportionate dilatation of  bowel. Appendix visualized in retrocecal location and without associated  inflammation. No free fluid or intra-abdominal free air. Pelvic viscera  grossly unremarkable without bulky pelvic adenopathy or free fluid.  Degenerative changes of the spine without aggressive osseous or soft  tissue body wall lesions including visualized portions of the pelvis. No  soft tissue body wall findings of concern.       Impression:       No acute intrathoracic, intra-abdominal or intrapelvic  findings with minimal chronic changes and postinfectious/post  inflammatory findings right lower lung. No mechanical obstructive  process or focal inflammatory findings of bowel.     DICTATED:   03/11/2020  EDITED/ls :   03/11/2020           CT Abdomen Pelvis Without Contrast [[id number]] Collected:  03/11/20 1357     Updated:  03/11/20 1631    Narrative:       EXAMINATION: CT ABDOMEN/PELVIS WO  CONTRAST, CT CHEST WO CONTRAST -  03/11/2020     INDICATION: Abdominal pain. Syncope.     TECHNIQUE: CT chest, abdomen and pelvis without intravenous contrast.     The radiation dose reduction device was turned on for each scan per the  ALARA (As Low as Reasonably Achievable) protocol.     COMPARISON: None.     FINDINGS:      CHEST: Thyroid is homogeneous in attenuation. No bulky mediastinal  adenopathy. Central airways are patent. Esophagus in normal course and  caliber. Atherosclerotic nonaneurysmal thoracic ureter. Cardiac size  within normal limits and without pericardial effusion. Extended lung  windows demonstrate biapical pleural-parenchymal scarring with central  bronchiectasis and midlung scarring however no dominant nodule or mass.  Calcified granuloma right lung base along with area of adjacent scarring  and small noncalcified 4 mm likely inflammatory nodule without  consolidation or effusion. Degenerative changes of the spine without  aggressive osseous or soft tissue body wall findings demonstrate  scoliotic curvature of the spine.     ABDOMEN AND PELVIS: Liver is without focal lesion. Gallbladder  surgically absent. No biliary dilatation. Pancreas and spleen  unremarkable with splenule adjacent the anterolateral margin of the  spleen. Adrenals demonstrate bilateral adrenal nodules 1 cm right and  1.3 cm left with attenuation values consistent of lipid rich adenomas.  Kidneys without hydronephrosis or hydroureter. No bulky retroperitoneal  adenopathy. Atherosclerotic nonaneurysmal abdominal aorta. GI tract  evaluation without focal thickening or disproportionate dilatation of  bowel. Appendix visualized in retrocecal location and without associated  inflammation. No free fluid or intra-abdominal free air. Pelvic viscera  grossly unremarkable without bulky pelvic adenopathy or free fluid.  Degenerative changes of the spine without aggressive osseous or soft  tissue body wall lesions including visualized  portions of the pelvis. No  soft tissue body wall findings of concern.       Impression:       No acute intrathoracic, intra-abdominal or intrapelvic  findings with minimal chronic changes and postinfectious/post  inflammatory findings right lower lung. No mechanical obstructive  process or focal inflammatory findings of bowel.     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 female presenting to the ED due to syncopal episode.  Found to be dehydrated with a blood pressure 77/48.  Patient responded IV hydration and has a blood pressure of 114/70. Positive for influenza A.  CT scan of the head negative for acute intracranial process.  CT of the chest negative for pulmonary emboli.  Positive for chronic changes and post infection/postinflammatory findings in the right lower lobe.  CT of the abdomen pelvis with no acute intra-thoracic, intra-abdominal or intrapelvic findings.    Syncope due to dehydration and hypotension  - patient received fluid resuscitation in the ER.  - patient hypotensive after arriving to the floor- NS 2 L bolus will be repeated. If no response, will discuss transfer to ICU for pressors. Clinically, she is asymptomatic and has no evidence of end-organ dysfunction.   - Continue  IV hydration with NS 100ml/hr maintenance once BP has normalized    - Orthostatic blood pressure checks starting tomorrow  - IV hydrocortisone for stress dose for 48 hrs     Influenza A, upper respiratory infection  - Tamiflu 30 mg bid for 5 days  - ceftriaxone 1 gram IV daily  - azithromycin 500 mg IV daily  - Adding respiratory coverage given some faint peripheral changes on CT chest and given equivocal procal with hypotension. Blood cultures pending in ER    COPD  - duo nebs q 6 hours and albuterol nebs as needed  - continuous pulse ox    HTN/HLD  - holding BP meds as patient is hypotensive  - continue asa 325 mg daily  - continue crestor 20 mg daily    CKD  - monitor kidney fun and avoid nephrotoxic med  - repeat labs in AM    DVT prophylaxis:  Heparin 5000 units q 12 hours and SCDs    CODE STATUS:    Code Status and Medical Interventions:   Ordered at: 03/11/20 1642     Level Of Support Discussed With:    Patient     Code Status:    CPR     Medical Interventions (Level of Support Prior to Arrest):    Full     Admission Status:  I believe this patient meets inpatient status due to hypotension requiring multiple liters of IVF in the setting of Influenza A and possible concomitant pneumonia with anticipated need for > 2 midnights LOS.     Electronically signed by UNRULY Heck, 03/11/20, 4:14 PM.      Attending   Admission Attestation     I have seen and examined the patient, performing an independent face-to-face diagnostic evaluation with plan of care reviewed and developed with the advanced practice clinician (APC).    Brief Summary Statement:   Lexii Duckworth is a 59 y.o. female with a past medical history of CVA, COPD, active tobacco use, chronic kidney disease stage III (baseline unknown), anxiety/depression, fibromyalgia, hypertension, hyperlipidemia, and migraine who presented to the [facility] emergency department today with a 2-3-day history of chills, fatigue, myalgias, anorexia, lightheadedness, cough, sore throat, and rhinorrhea.  Occasional pleurisy but  "no chest pain or palpitations.  She had a syncopal episode while leaning forward to tie shoes earlier today leading to EMS being called.  No associated injury and she returned to her baseline mentation thereafter.  In the ER, patient was found to be hypotensive but improved with systolic blood pressure in the 110s after 2 L IV normal saline.  She does take tizanidine daily but denies recent increase in use. Not recently or chronically on steroids. No GI symptoms with illness.    On arrival to the floor her SBP was found to be in the 50s. During my visit, she was awake, alert, sitting in bed without lightheadedness, dyspnea, or chest pain.     Remainder of detailed HPI is as noted by APC and has been reviewed and/or edited by me for completeness.    Attending Physical Exam:  BP 93/67   Pulse 66   Temp 97.6 °F (36.4 °C) (Oral)   Resp 20   Ht 160 cm (63\")   Wt 76.2 kg (168 lb)   SpO2 94%   BMI 29.76 kg/m²   Constitutional: Awake, alert, nontoxic, ill-appearing  Eyes: PERRLA, sclerae anicteric, no conjunctival injection  HENT: NCAT, mucous membranes dry  Neck: Supple, no thyromegaly, no lymphadenopathy, trachea midline, no rigidity   Respiratory: Clear to auscultation bilaterally but diminished throughout, nonlabored respirations, no wheezing   Cardiovascular: RRR (NSR with HR in 60s during my visit on tele), no murmurs, rubs, or gallops, 2+ palpable pedal pulses bilaterally  Gastrointestinal: Positive bowel sounds, soft, mild diffuse abdominal tenderness without rebound or guarding, nondistended  Musculoskeletal: No bilateral ankle edema, no clubbing or cyanosis to extremities  Psychiatric: Appropriate affect, cooperative  Neurologic: Oriented x 3, strength symmetric in all extremities, Cranial Nerves grossly intact to confrontation, speech clear  Skin: No rashes on exam of back, abdomen, and extremities    Labs reviewed. Inf A + on respiratory panel. Lactate normal. Cr elevated. Procal equivocal. WBC normal. "   ECG with NSR   UA with pyuria but + squamous cells.   CTA chest personally reviewed. No consolidative infiltrates seen.   CT Abd/pelvis negative.       Brief Assessment/Plan :  See detailed assessment and plan developed with APC which I have reviewed and/or edited for completeness.    Electronically signed by Addison Reed MD, 03/11/20, 5:53 PM.

## 2020-03-11 NOTE — ED PROVIDER NOTES
"Subjective   Patient presents to the emergency department via ambulance and accompanied by her adult daughter.  Patient reports \"I just do not feel very good\".  Her daughter reports on her behalf that while the patient was climbing down from her bunk where they have been living at the [facility] for approximately 30 days now, the patient was weak and collapsed, having a syncopal event but caught by the daughter to avoid injury.  Ambulance was called to the scene.  Patient presents to the ER today and hypotension but awake.  Further inquiry reveals the daughter's description of the patient last night before bedtime was \"she seemed to be going in and out and her eyes rolling in the back of her head\".    Patient states she has been taking tramadol and hydrocodone.  She presents with a prescription for tramadol dated mid February and a prescription for Lortab 5 dated February 5.  She is taking them as prescribed, consuming 19 of 20 pills.  Patient states she has not taken 1 as of this morning.    Patient states she has had mild cough for a few weeks.  She has had no fever to her knowledge.  She has had poor appetite.  She denies any urinary symptoms.  \"I have been a kidney failure before\".        Syncope   Episode history:  Single  Most recent episode:  Today  Progression:  Resolved  Chronicity:  New  Context: medication change    Context: not blood draw and not dehydration    Witnessed: yes    Relieved by:  Lying down  Worsened by:  Nothing  Ineffective treatments:  None tried  Associated symptoms: anxiety, dizziness, malaise/fatigue, nausea and weakness    Associated symptoms: no chest pain, no confusion, no difficulty breathing, no fever, no focal weakness, no palpitations, no recent fall, no recent injury and no shortness of breath        Review of Systems   Constitutional: Positive for malaise/fatigue. Negative for fever.   Respiratory: Negative for shortness of breath.    Cardiovascular: Positive for syncope. " Negative for chest pain and palpitations.   Gastrointestinal: Positive for nausea.   Neurological: Positive for dizziness and weakness. Negative for focal weakness.   Psychiatric/Behavioral: Negative for confusion.   All other systems reviewed and are negative.      Past Medical History:   Diagnosis Date   • Depression    • Hyperlipidemia    • Hypertension    • Migraine headache    • Neck pain    • Osteoarthritis    • Stroke (CMS/HCC)     left hemispheric (cortical) stroke       No Known Allergies    History reviewed. No pertinent surgical history.    Family History   Problem Relation Age of Onset   • Cancer Father    • Other Other         epilepy, hypertension, stroke syndrome       Social History     Socioeconomic History   • Marital status:      Spouse name: Not on file   • Number of children: Not on file   • Years of education: Not on file   • Highest education level: Not on file   Tobacco Use   • Smoking status: Current Every Day Smoker     Types: Cigarettes   Substance and Sexual Activity   • Alcohol use: No         Objective   Physical Exam   Constitutional: She is oriented to person, place, and time. She appears well-developed and well-nourished. She appears ill. No distress.   Patient is ill-appearing and appears older than her stated age w/ severe hypotension.  Daughter is present to communicate with her and for her.  She is a good historian.     HENT:   Head: Normocephalic and atraumatic.   Head is unremarkable.  Posterior pharynx is benign.   Eyes: Pupils are equal, round, and reactive to light. Conjunctivae are normal. No scleral icterus.   Neck: Normal range of motion. Neck supple. No JVD present.   Neck is unremarkable.   Cardiovascular: Normal rate, regular rhythm and normal heart sounds.   No murmur heard.  Regular rate and rhythm in 100s. No murmur.   Pulmonary/Chest: Effort normal and breath sounds normal. No respiratory distress.   Few scattered ronchi.   Abdominal: Soft. There is  tenderness.   Abdomen is diffusely mild tender peripherally.   Musculoskeletal: Normal range of motion. She exhibits no edema.   No peripheral edema.   Neurological: She is alert and oriented to person, place, and time.   No neurosensory deficit or focal weakness.   Skin: Skin is warm and dry.   Psychiatric: She has a normal mood and affect. Her behavior is normal.   Nursing note and vitals reviewed.      Procedures         ED Course  ED Course as of Mar 11 1551   Wed Mar 11, 2020   1215 [place] request number: [id number]     [KR]   1216 [place] query complete. Treatment plan to include limited course of prescribed  controlled substance. Risks including addiction, benefits, and alternatives presented to patient.       [KR]   1330 BUN(!): 35 [MS]   1330 Creatinine(!): 1.73 [MS]   1341 WBC, UA(!): 6-12 [MS]   1403 Patient continues to have hypotension without tachycardia.  No encephalopathy.  She is alert and cooperative.  She is hungry and requesting food.  Imaging studies are pending.  Fluids are infusing for now.      [MS]   1444 Influenza A H1 2009 PCR(!): Detected [MS]   1549 UTI is noted.  Will address with culture and give rocephin.  Discussed case with hospitalist, Dr. Reed who concurs with admission.  Patient and daughter understand and concur.    [MS]      ED Course User Index  [KR] Esequiel Jiménez  [MS] Yanni Chapa, UNRULY     Recent Results (from the past 24 hour(s))   Urinalysis With Culture If Indicated - Urine, Catheter    Collection Time: 03/11/20 11:50 AM   Result Value Ref Range    Color, UA Yellow Yellow, Straw    Appearance, UA Clear Clear    pH, UA 6.5 5.0 - 8.0    Specific Gravity, UA 1.020 1.001 - 1.030    Glucose, UA Negative Negative    Ketones, UA Trace (A) Negative    Bilirubin, UA Negative Negative    Blood, UA Negative Negative    Protein,  mg/dL (2+) (A) Negative    Leuk Esterase, UA Trace (A) Negative    Nitrite, UA Negative Negative    Urobilinogen, UA 1.0 E.U./dL 0.2 - 1.0  E.U./dL   Urinalysis, Microscopic Only - Urine, Catheter    Collection Time: 03/11/20 11:50 AM   Result Value Ref Range    RBC, UA 3-6 (A) None Seen, 0-2 /HPF    WBC, UA 6-12 (A) None Seen, 0-2 /HPF    Bacteria, UA Trace None Seen, Trace /HPF    Squamous Epithelial Cells, UA 3-6 (A) None Seen, 0-2 /HPF    Hyaline Casts, UA 7-12 0 - 6 /LPF    WBC Casts 0-2 None Seen /LPF    Methodology Manual Light Microscopy    Comprehensive Metabolic Panel    Collection Time: 03/11/20 12:01 PM   Result Value Ref Range    Glucose 100 (H) 65 - 99 mg/dL    BUN 35 (H) 6 - 20 mg/dL    Creatinine 1.73 (H) 0.57 - 1.00 mg/dL    Sodium 136 136 - 145 mmol/L    Potassium 4.0 3.5 - 5.2 mmol/L    Chloride 97 (L) 98 - 107 mmol/L    CO2 23.0 22.0 - 29.0 mmol/L    Calcium 8.6 8.6 - 10.5 mg/dL    Total Protein 6.9 6.0 - 8.5 g/dL    Albumin 4.10 3.50 - 5.20 g/dL    ALT (SGPT) 51 (H) 1 - 33 U/L    AST (SGOT) 73 (H) 1 - 32 U/L    Alkaline Phosphatase 86 39 - 117 U/L    Total Bilirubin 0.4 0.2 - 1.2 mg/dL    eGFR Non African Amer 30 (L) >60 mL/min/1.73    Globulin 2.8 gm/dL    A/G Ratio 1.5 g/dL    BUN/Creatinine Ratio 20.2 7.0 - 25.0    Anion Gap 16.0 (H) 5.0 - 15.0 mmol/L   Magnesium    Collection Time: 03/11/20 12:01 PM   Result Value Ref Range    Magnesium 2.0 1.6 - 2.6 mg/dL   Troponin    Collection Time: 03/11/20 12:01 PM   Result Value Ref Range    Troponin T <0.010 0.000 - 0.030 ng/mL   Light Blue Top    Collection Time: 03/11/20 12:01 PM   Result Value Ref Range    Extra Tube hold for add-on    Green Top (Gel)    Collection Time: 03/11/20 12:01 PM   Result Value Ref Range    Extra Tube Hold for add-ons.    Lavender Top    Collection Time: 03/11/20 12:01 PM   Result Value Ref Range    Extra Tube hold for add-on    Gold Top - SST    Collection Time: 03/11/20 12:01 PM   Result Value Ref Range    Extra Tube Hold for add-ons.    CBC Auto Differential    Collection Time: 03/11/20 12:01 PM   Result Value Ref Range    WBC 4.58 3.40 - 10.80 10*3/mm3     RBC 4.02 3.77 - 5.28 10*6/mm3    Hemoglobin 11.9 (L) 12.0 - 15.9 g/dL    Hematocrit 38.9 34.0 - 46.6 %    MCV 96.8 79.0 - 97.0 fL    MCH 29.6 26.6 - 33.0 pg    MCHC 30.6 (L) 31.5 - 35.7 g/dL    RDW 14.5 12.3 - 15.4 %    RDW-SD 52.0 37.0 - 54.0 fl    MPV 12.1 (H) 6.0 - 12.0 fL    Platelets 150 140 - 450 10*3/mm3    Neutrophil % 76.0 42.7 - 76.0 %    Lymphocyte % 11.6 (L) 19.6 - 45.3 %    Monocyte % 11.8 5.0 - 12.0 %    Eosinophil % 0.0 (L) 0.3 - 6.2 %    Basophil % 0.4 0.0 - 1.5 %    Immature Grans % 0.2 0.0 - 0.5 %    Neutrophils, Absolute 3.48 1.70 - 7.00 10*3/mm3    Lymphocytes, Absolute 0.53 (L) 0.70 - 3.10 10*3/mm3    Monocytes, Absolute 0.54 0.10 - 0.90 10*3/mm3    Eosinophils, Absolute 0.00 0.00 - 0.40 10*3/mm3    Basophils, Absolute 0.02 0.00 - 0.20 10*3/mm3    Immature Grans, Absolute 0.01 0.00 - 0.05 10*3/mm3    nRBC 0.0 0.0 - 0.2 /100 WBC   Procalcitonin    Collection Time: 03/11/20 12:01 PM   Result Value Ref Range    Procalcitonin 0.43 (H) 0.10 - 0.25 ng/mL   BNP    Collection Time: 03/11/20 12:01 PM   Result Value Ref Range    proBNP 425.0 5.0 - 900.0 pg/mL   Lactic Acid, Plasma    Collection Time: 03/11/20 12:02 PM   Result Value Ref Range    Lactate 1.3 0.5 - 2.0 mmol/L   D-dimer, Quantitative    Collection Time: 03/11/20 12:02 PM   Result Value Ref Range    D-Dimer, Quantitative 0.74 (H) 0.00 - 0.56 MCGFEU/mL   Respiratory Panel, PCR - Swab, Nasopharynx    Collection Time: 03/11/20 12:40 PM   Result Value Ref Range    ADENOVIRUS, PCR Not Detected Not Detected    Coronavirus 229E Not Detected Not Detected    Coronavirus HKU1 Not Detected Not Detected    Coronavirus NL63 Not Detected Not Detected    Coronavirus OC43 Not Detected Not Detected    Human Metapneumovirus Not Detected Not Detected    Human Rhinovirus/Enterovirus Not Detected Not Detected    Influenza B PCR Not Detected Not Detected    Parainfluenza Virus 1 Not Detected Not Detected    Parainfluenza Virus 2 Not Detected Not Detected     Parainfluenza Virus 3 Not Detected Not Detected    Parainfluenza Virus 4 Not Detected Not Detected    Bordetella pertussis pcr Not Detected Not Detected    Influenza A H1 2009 PCR Detected (A) Not Detected    Chlamydophila pneumoniae PCR Not Detected Not Detected    Mycoplasma pneumo by PCR Not Detected Not Detected    Influenza A H3 Not Detected Not Detected    Influenza A H1 Not Detected Not Detected    RSV, PCR Not Detected Not Detected    Bordetella parapertussis PCR Not Detected Not Detected     Note: In addition to lab results from this visit, the labs listed above may include labs taken at another facility or during a different encounter within the last 24 hours. Please correlate lab times with ED admission and discharge times for further clarification of the services performed during this visit.    CT Head Without Contrast   Preliminary Result   No acute intracranial abnormality              CT Chest Without Contrast   Preliminary Result   No acute intrathoracic, intra-abdominal or intrapelvic   findings with minimal chronic changes and postinfectious/post   inflammatory findings right lower lung. No mechanical obstructive   process or focal inflammatory findings of bowel.              CT Abdomen Pelvis Without Contrast   Preliminary Result   No acute intrathoracic, intra-abdominal or intrapelvic   findings with minimal chronic changes and postinfectious/post   inflammatory findings right lower lung. No mechanical obstructive   process or focal inflammatory findings of bowel.                Vitals:    03/11/20 1458 03/11/20 1537 03/11/20 1540 03/11/20 1542   BP:  102/56 105/55 91/58   BP Location:       Patient Position:  Lying Sitting Standing   Pulse: 87 91 91 91   Resp:       Temp:       TempSrc:       SpO2: 94%      Weight:       Height:         Medications   sodium chloride 0.9 % flush 10 mL (has no administration in time range)   sodium chloride 0.9 % bolus 2,000 mL (2,000 mL Intravenous New Bag  3/11/20 1241)     ECG/EMG Results (last 24 hours)     Procedure Component Value Units Date/Time    ECG 12 Lead [[id number]] Collected:  03/11/20 1134     Updated:  03/11/20 1201        ECG 12 Lead                                                    MDM    Final diagnoses:   Influenza A   Syncope and collapse   Hypotension, unspecified hypotension type   History of COPD   Homelessness   History of back pain       Documentation assistance provided by tyler Jiménez.  Information recorded by the scribe was done at my direction and has been verified and validated by me.     Esequiel Jiménez  03/11/20 1211       Yanni Chapa, UNRULY  03/11/20 5497

## 2020-03-11 NOTE — PLAN OF CARE
Pt arrived to the floor at 1700. SBP 50s/30s. MD contacted. 2L bolus infused in [place]. A&Ox4. Daughter at bedside. NSR per monitor. 2L NC. Pt denies c/o acute pain at this timem. Will continue to monitor.

## 2020-03-11 NOTE — ED PROVIDER NOTES
"Subjective   Patient presents to the emergency department via ambulance and accompanied by her adult daughter.  Patient reports \"I just do not feel very good\".  Her daughter reports on her behalf that while the patient was climbing down from her bunk where they have been living at the [facility] for approximately 30 days now, the patient was weak and collapsed, having a syncopal event but caught by the daughter to avoid injury.  Ambulance was called to the scene.  Patient presents to the ER today and hypotension but awake.  Further inquiry reveals the daughter's description of the patient last night before bedtime was \"she seemed to be going in and out and her eyes rolling in the back of her head\".    Patient states she has been taking tramadol and hydrocodone.  She presents with a prescription for tramadol dated mid February and a prescription for Lortab 5 dated February 5.  She is taking them as prescribed, consuming 19 of 20 pills.  Patient states she has not taken 1 as of this morning.    Patient states she has had mild cough for a few weeks.  She has had no fever to her knowledge.  She has had poor appetite.  She denies any urinary symptoms.  \"I have been a kidney failure before\".        History provided by:  Patient   used: No        Review of Systems   Constitutional: Positive for appetite change and fatigue. Negative for fever.   HENT: Negative.    Respiratory: Positive for cough. Negative for shortness of breath.    Cardiovascular: Negative for chest pain and leg swelling.   Gastrointestinal: Positive for abdominal pain. Negative for diarrhea and vomiting.   Endocrine: Negative.    Genitourinary: Negative.  Negative for flank pain and pelvic pain.        No dysuria, frequency or urgency   Musculoskeletal: Positive for back pain.   Skin: Negative for pallor and rash.   Allergic/Immunologic: Negative.    Neurological: Positive for dizziness, syncope, weakness and light-headedness. " Negative for seizures.   All other systems reviewed and are negative.      Past Medical History:   Diagnosis Date   • Depression    • Hyperlipidemia    • Hypertension    • Migraine headache    • Neck pain    • Osteoarthritis    • Stroke (CMS/HCC)     left hemispheric (cortical) stroke       No Known Allergies    History reviewed. No pertinent surgical history.    Family History   Problem Relation Age of Onset   • Cancer Father    • Other Other         epilepy, hypertension, stroke syndrome       Social History     Socioeconomic History   • Marital status:      Spouse name: Not on file   • Number of children: Not on file   • Years of education: Not on file   • Highest education level: Not on file   Tobacco Use   • Smoking status: Current Every Day Smoker     Types: Cigarettes   Substance and Sexual Activity   • Alcohol use: No           Objective   Physical Exam    Procedures           ED Course                                           MDM    Final diagnoses:   None

## 2020-03-12 PROBLEM — E86.0 DEHYDRATION: Status: ACTIVE | Noted: 2020-03-12

## 2020-03-12 LAB
ALBUMIN SERPL-MCNC: 3 G/DL (ref 3.5–5.2)
ALBUMIN/GLOB SERPL: 1.4 G/DL
ALP SERPL-CCNC: 69 U/L (ref 39–117)
ALT SERPL W P-5'-P-CCNC: 37 U/L (ref 1–33)
ANION GAP SERPL CALCULATED.3IONS-SCNC: 9 MMOL/L (ref 5–15)
AST SERPL-CCNC: 47 U/L (ref 1–32)
BASOPHILS # BLD AUTO: 0.01 10*3/MM3 (ref 0–0.2)
BASOPHILS NFR BLD AUTO: 0.3 % (ref 0–1.5)
BILIRUB SERPL-MCNC: <0.2 MG/DL (ref 0.2–1.2)
BUN BLD-MCNC: 28 MG/DL (ref 6–20)
BUN/CREAT SERPL: 21.2 (ref 7–25)
CALCIUM SPEC-SCNC: 7.5 MG/DL (ref 8.6–10.5)
CHLORIDE SERPL-SCNC: 111 MMOL/L (ref 98–107)
CO2 SERPL-SCNC: 23 MMOL/L (ref 22–29)
CORTIS SERPL-MCNC: 7.42 MCG/DL
CREAT BLD-MCNC: 1.32 MG/DL (ref 0.57–1)
DEPRECATED RDW RBC AUTO: 53.6 FL (ref 37–54)
EOSINOPHIL # BLD AUTO: 0.01 10*3/MM3 (ref 0–0.4)
EOSINOPHIL NFR BLD AUTO: 0.3 % (ref 0.3–6.2)
ERYTHROCYTE [DISTWIDTH] IN BLOOD BY AUTOMATED COUNT: 14.6 % (ref 12.3–15.4)
GFR SERPL CREATININE-BSD FRML MDRD: 41 ML/MIN/1.73
GLOBULIN UR ELPH-MCNC: 2.2 GM/DL
GLUCOSE BLD-MCNC: 74 MG/DL (ref 65–99)
HCT VFR BLD AUTO: 36.2 % (ref 34–46.6)
HGB BLD-MCNC: 10.5 G/DL (ref 12–15.9)
IMM GRANULOCYTES # BLD AUTO: 0.03 10*3/MM3 (ref 0–0.05)
IMM GRANULOCYTES NFR BLD AUTO: 0.8 % (ref 0–0.5)
LYMPHOCYTES # BLD AUTO: 1.04 10*3/MM3 (ref 0.7–3.1)
LYMPHOCYTES NFR BLD AUTO: 26.7 % (ref 19.6–45.3)
MCH RBC QN AUTO: 28.9 PG (ref 26.6–33)
MCHC RBC AUTO-ENTMCNC: 29 G/DL (ref 31.5–35.7)
MCV RBC AUTO: 99.7 FL (ref 79–97)
MONOCYTES # BLD AUTO: 0.39 10*3/MM3 (ref 0.1–0.9)
MONOCYTES NFR BLD AUTO: 10 % (ref 5–12)
NEUTROPHILS # BLD AUTO: 2.41 10*3/MM3 (ref 1.7–7)
NEUTROPHILS NFR BLD AUTO: 61.9 % (ref 42.7–76)
NRBC BLD AUTO-RTO: 0 /100 WBC (ref 0–0.2)
PLATELET # BLD AUTO: 114 10*3/MM3 (ref 140–450)
PMV BLD AUTO: 12.1 FL (ref 6–12)
POTASSIUM BLD-SCNC: 4.3 MMOL/L (ref 3.5–5.2)
PROT SERPL-MCNC: 5.2 G/DL (ref 6–8.5)
RBC # BLD AUTO: 3.63 10*6/MM3 (ref 3.77–5.28)
SODIUM BLD-SCNC: 143 MMOL/L (ref 136–145)
WBC NRBC COR # BLD: 3.89 10*3/MM3 (ref 3.4–10.8)

## 2020-03-12 PROCEDURE — 96372 THER/PROPH/DIAG INJ SC/IM: CPT

## 2020-03-12 PROCEDURE — 96361 HYDRATE IV INFUSION ADD-ON: CPT

## 2020-03-12 PROCEDURE — G0378 HOSPITAL OBSERVATION PER HR: HCPCS

## 2020-03-12 PROCEDURE — 25010000002 CEFTRIAXONE PER 250 MG: Performed by: NURSE PRACTITIONER

## 2020-03-12 PROCEDURE — 82533 TOTAL CORTISOL: CPT | Performed by: PHYSICIAN ASSISTANT

## 2020-03-12 PROCEDURE — 99225 PR SBSQ OBSERVATION CARE/DAY 25 MINUTES: CPT | Performed by: HOSPITALIST

## 2020-03-12 PROCEDURE — 94799 UNLISTED PULMONARY SVC/PX: CPT

## 2020-03-12 PROCEDURE — 85025 COMPLETE CBC W/AUTO DIFF WBC: CPT | Performed by: NURSE PRACTITIONER

## 2020-03-12 PROCEDURE — 96366 THER/PROPH/DIAG IV INF ADDON: CPT

## 2020-03-12 PROCEDURE — 25010000002 AZITHROMYCIN PER 500 MG: Performed by: NURSE PRACTITIONER

## 2020-03-12 PROCEDURE — 80053 COMPREHEN METABOLIC PANEL: CPT | Performed by: NURSE PRACTITIONER

## 2020-03-12 PROCEDURE — 94640 AIRWAY INHALATION TREATMENT: CPT

## 2020-03-12 PROCEDURE — 25010000002 HEPARIN (PORCINE) PER 1000 UNITS: Performed by: NURSE PRACTITIONER

## 2020-03-12 RX ORDER — DOCUSATE SODIUM 100 MG/1
100 CAPSULE, LIQUID FILLED ORAL DAILY
Status: DISCONTINUED | OUTPATIENT
Start: 2020-03-12 | End: 2020-03-13

## 2020-03-12 RX ORDER — HYDROCODONE BITARTRATE AND ACETAMINOPHEN 5; 325 MG/1; MG/1
1 TABLET ORAL EVERY 6 HOURS PRN
Status: DISCONTINUED | OUTPATIENT
Start: 2020-03-12 | End: 2020-03-13

## 2020-03-12 RX ORDER — CHOLECALCIFEROL (VITAMIN D3) 125 MCG
5 CAPSULE ORAL NIGHTLY
Status: DISCONTINUED | OUTPATIENT
Start: 2020-03-12 | End: 2020-03-15 | Stop reason: HOSPADM

## 2020-03-12 RX ADMIN — SODIUM CHLORIDE 100 ML/HR: 9 INJECTION, SOLUTION INTRAVENOUS at 05:54

## 2020-03-12 RX ADMIN — IPRATROPIUM BROMIDE AND ALBUTEROL SULFATE 3 ML: 2.5; .5 SOLUTION RESPIRATORY (INHALATION) at 17:04

## 2020-03-12 RX ADMIN — DOCUSATE SODIUM 100 MG: 100 CAPSULE, LIQUID FILLED ORAL at 13:00

## 2020-03-12 RX ADMIN — OSELTAMIVIR PHOSPHATE 30 MG: 30 CAPSULE ORAL at 08:14

## 2020-03-12 RX ADMIN — HYDROCODONE BITARTRATE AND ACETAMINOPHEN 1 TABLET: 5; 325 TABLET ORAL at 13:00

## 2020-03-12 RX ADMIN — ROSUVASTATIN CALCIUM 20 MG: 20 TABLET, COATED ORAL at 08:14

## 2020-03-12 RX ADMIN — OSELTAMIVIR PHOSPHATE 30 MG: 30 CAPSULE ORAL at 21:45

## 2020-03-12 RX ADMIN — VENLAFAXINE HYDROCHLORIDE 150 MG: 75 CAPSULE, EXTENDED RELEASE ORAL at 08:14

## 2020-03-12 RX ADMIN — FOLIC ACID 1 MG: 1 TABLET ORAL at 08:13

## 2020-03-12 RX ADMIN — GABAPENTIN 300 MG: 300 CAPSULE ORAL at 13:00

## 2020-03-12 RX ADMIN — TRAZODONE HYDROCHLORIDE 25 MG: 50 TABLET ORAL at 21:45

## 2020-03-12 RX ADMIN — MELATONIN TAB 5 MG 5 MG: 5 TAB at 21:45

## 2020-03-12 RX ADMIN — IPRATROPIUM BROMIDE AND ALBUTEROL SULFATE 3 ML: 2.5; .5 SOLUTION RESPIRATORY (INHALATION) at 13:58

## 2020-03-12 RX ADMIN — AZITHROMYCIN MONOHYDRATE 500 MG: 500 INJECTION, POWDER, LYOPHILIZED, FOR SOLUTION INTRAVENOUS at 18:39

## 2020-03-12 RX ADMIN — PANTOPRAZOLE SODIUM 40 MG: 40 TABLET, DELAYED RELEASE ORAL at 17:48

## 2020-03-12 RX ADMIN — CEFTRIAXONE SODIUM 1 G: 1 INJECTION, POWDER, FOR SOLUTION INTRAMUSCULAR; INTRAVENOUS at 17:48

## 2020-03-12 RX ADMIN — SODIUM CHLORIDE, PRESERVATIVE FREE 10 ML: 5 INJECTION INTRAVENOUS at 21:48

## 2020-03-12 RX ADMIN — SODIUM CHLORIDE 125 ML/HR: 9 INJECTION, SOLUTION INTRAVENOUS at 16:01

## 2020-03-12 RX ADMIN — SODIUM CHLORIDE, PRESERVATIVE FREE 10 ML: 5 INJECTION INTRAVENOUS at 08:17

## 2020-03-12 RX ADMIN — GABAPENTIN 300 MG: 300 CAPSULE ORAL at 21:45

## 2020-03-12 RX ADMIN — TRAMADOL HYDROCHLORIDE 50 MG: 50 TABLET, FILM COATED ORAL at 00:17

## 2020-03-12 RX ADMIN — HYDROCODONE BITARTRATE AND ACETAMINOPHEN 1 TABLET: 5; 325 TABLET ORAL at 21:45

## 2020-03-12 RX ADMIN — HEPARIN SODIUM 5000 UNITS: 5000 INJECTION, SOLUTION INTRAVENOUS; SUBCUTANEOUS at 21:44

## 2020-03-12 RX ADMIN — IPRATROPIUM BROMIDE AND ALBUTEROL SULFATE 3 ML: 2.5; .5 SOLUTION RESPIRATORY (INHALATION) at 09:48

## 2020-03-12 RX ADMIN — ASPIRIN 325 MG ORAL TABLET 325 MG: 325 PILL ORAL at 08:14

## 2020-03-12 RX ADMIN — MICONAZOLE NITRATE 200 MG: 200 SUPPOSITORY VAGINAL at 21:49

## 2020-03-12 RX ADMIN — NICOTINE 1 PATCH: 21 PATCH, EXTENDED RELEASE TRANSDERMAL at 17:47

## 2020-03-12 RX ADMIN — HEPARIN SODIUM 5000 UNITS: 5000 INJECTION, SOLUTION INTRAVENOUS; SUBCUTANEOUS at 08:13

## 2020-03-12 RX ADMIN — GABAPENTIN 300 MG: 300 CAPSULE ORAL at 05:53

## 2020-03-12 RX ADMIN — PANTOPRAZOLE SODIUM 40 MG: 40 TABLET, DELAYED RELEASE ORAL at 05:53

## 2020-03-12 RX ADMIN — Medication 1 CAPSULE: at 08:14

## 2020-03-12 NOTE — PLAN OF CARE
Problem: Patient Care Overview  Goal: Plan of Care Review  Outcome: Ongoing (interventions implemented as appropriate)  Flowsheets  Taken 3/12/2020 0501  Progress: no change  Outcome Summary: Pt A&O. BP's have been stable and have improved. NSR on tele. 2L NC. Minimal c/o pain; PRN Tramadol given. Droplet precautions maintained. NS infusing @ 100ml/hr. Will continue to monitor.  Taken 3/11/2020 2000  Plan of Care Reviewed With: patient;daughter     Problem: Pain, Chronic (Adult)  Goal: Identify Related Risk Factors and Signs and Symptoms  Outcome: Ongoing (interventions implemented as appropriate)  Flowsheets (Taken 3/12/2020 0501)  Related Risk Factors (Chronic Pain): disease process; knowledge deficit  Signs and Symptoms (Chronic Pain): verbalization of pain descriptors     Problem: Fall Risk (Adult)  Goal: Identify Related Risk Factors and Signs and Symptoms  Outcome: Ongoing (interventions implemented as appropriate)  Flowsheets (Taken 3/12/2020 0501)  Related Risk Factors (Fall Risk): culprit medication(s); history of falls; environment unfamiliar  Signs and Symptoms (Fall Risk): presence of risk factors

## 2020-03-12 NOTE — PROGRESS NOTES
Discharge Planning Assessment  [facility]     Patient Name: Lexii Duckworth  MRN: [id number]  Today's Date: 3/12/2020    Admit Date: 3/11/2020    Discharge Needs Assessment     Row Name 03/12/20 1114       Living Environment    Lives With  other (see comments)    Name(s) of Who Lives With Patient  Pt is currently homeless and has been staying at the [facility]    Current Living Arrangements  homeless    Duration at Residence  Pt has been staying at the [facility] for a month    Primary Care Provided by  self    Provides Primary Care For  no one    Quality of Family Relationships  unable to assess    Able to Return to Prior Arrangements  yes       Resource/Environmental Concerns    Financial Concerns  other (see comments)       Transition Planning    Patient/Family Anticipates Transition to  shelter    Transportation Anticipated  public transportation       Discharge Needs Assessment    Readmission Within the Last 30 Days  no previous admission in last 30 days    Concerns to be Addressed  discharge planning;basic needs;homelessness    Equipment Currently Used at Home  none    Anticipated Changes Related to Illness  none    Equipment Needed After Discharge  none        Discharge Plan     Row Name 03/12/20 1118       Plan    Plan  Return to the [facility]    Patient/Family in Agreement with Plan  yes    Plan Comments  Pt reports she is currently homeless and has been staying at the [facility] for the past month. She denies use of any DME/HH and reports she is independent with ADLs. She does have a PCP and reports her medicaid covers the cost of her medications. SW has been notified of homelessness. Current plan for discharge is to return to the [facility].    Final Discharge Disposition Code  01 - home or self-care        Destination      Coordination has not been started for this encounter.      Durable Medical Equipment      Coordination has not been started for this encounter.       Dialysis/Infusion      Coordination has not been started for this encounter.      Home Medical Care      Coordination has not been started for this encounter.      Therapy      Coordination has not been started for this encounter.      Community Resources      Coordination has not been started for this encounter.          Demographic Summary     Row Name 03/12/20 1114       General Information    Admission Type  observation    Referral Source  patient    Reason for Consult  discharge planning    General Information Comments  PCP Fausto Valentin        Contact Information    Permission Granted to Share Info With  ;family/designee    Contact Information Comments  April Duckworth [phone]        Functional Status     Row Name 03/12/20 1114       Functional Status, IADL    Medications  independent    Meal Preparation  independent    Housekeeping  independent    Laundry  independent    Shopping  independent       Mental Status    General Appearance WDL  WDL       Mental Status Summary    Recent Changes in Mental Status/Cognitive Functioning  no changes        Psychosocial    No documentation.       Abuse/Neglect    No documentation.       Legal    No documentation.       Substance Abuse    No documentation.       Patient Forms    No documentation.           Oly Reddy, RN

## 2020-03-13 LAB — BACTERIA SPEC AEROBE CULT: NO GROWTH

## 2020-03-13 PROCEDURE — 96372 THER/PROPH/DIAG INJ SC/IM: CPT

## 2020-03-13 PROCEDURE — 99225 PR SBSQ OBSERVATION CARE/DAY 25 MINUTES: CPT | Performed by: HOSPITALIST

## 2020-03-13 PROCEDURE — 96361 HYDRATE IV INFUSION ADD-ON: CPT

## 2020-03-13 PROCEDURE — 25010000002 HEPARIN (PORCINE) PER 1000 UNITS: Performed by: NURSE PRACTITIONER

## 2020-03-13 PROCEDURE — 96366 THER/PROPH/DIAG IV INF ADDON: CPT

## 2020-03-13 PROCEDURE — 97161 PT EVAL LOW COMPLEX 20 MIN: CPT

## 2020-03-13 PROCEDURE — 97166 OT EVAL MOD COMPLEX 45 MIN: CPT

## 2020-03-13 PROCEDURE — 94799 UNLISTED PULMONARY SVC/PX: CPT

## 2020-03-13 PROCEDURE — 94660 CPAP INITIATION&MGMT: CPT

## 2020-03-13 PROCEDURE — 25010000002 CEFTRIAXONE PER 250 MG: Performed by: NURSE PRACTITIONER

## 2020-03-13 PROCEDURE — G0378 HOSPITAL OBSERVATION PER HR: HCPCS

## 2020-03-13 RX ORDER — AZITHROMYCIN 250 MG/1
500 TABLET, FILM COATED ORAL
Status: COMPLETED | OUTPATIENT
Start: 2020-03-13 | End: 2020-03-15

## 2020-03-13 RX ORDER — GABAPENTIN 400 MG/1
800 CAPSULE ORAL EVERY 8 HOURS SCHEDULED
Status: DISCONTINUED | OUTPATIENT
Start: 2020-03-13 | End: 2020-03-15 | Stop reason: HOSPADM

## 2020-03-13 RX ORDER — TIZANIDINE 4 MG/1
4 TABLET ORAL EVERY 8 HOURS SCHEDULED
Status: DISCONTINUED | OUTPATIENT
Start: 2020-03-13 | End: 2020-03-15 | Stop reason: HOSPADM

## 2020-03-13 RX ORDER — HYDROCODONE BITARTRATE AND ACETAMINOPHEN 7.5; 325 MG/1; MG/1
1 TABLET ORAL EVERY 6 HOURS PRN
Status: DISCONTINUED | OUTPATIENT
Start: 2020-03-13 | End: 2020-03-15 | Stop reason: HOSPADM

## 2020-03-13 RX ADMIN — VENLAFAXINE HYDROCHLORIDE 150 MG: 75 CAPSULE, EXTENDED RELEASE ORAL at 09:04

## 2020-03-13 RX ADMIN — GABAPENTIN 800 MG: 400 CAPSULE ORAL at 13:59

## 2020-03-13 RX ADMIN — OSELTAMIVIR PHOSPHATE 30 MG: 30 CAPSULE ORAL at 09:04

## 2020-03-13 RX ADMIN — AZITHROMYCIN DIHYDRATE 500 MG: 250 TABLET, FILM COATED ORAL at 15:22

## 2020-03-13 RX ADMIN — HYDROCODONE BITARTRATE AND ACETAMINOPHEN 1 TABLET: 5; 325 TABLET ORAL at 09:05

## 2020-03-13 RX ADMIN — TIZANIDINE 4 MG: 4 TABLET ORAL at 21:47

## 2020-03-13 RX ADMIN — GABAPENTIN 800 MG: 400 CAPSULE ORAL at 21:45

## 2020-03-13 RX ADMIN — MELATONIN TAB 5 MG 5 MG: 5 TAB at 21:47

## 2020-03-13 RX ADMIN — FOLIC ACID 1 MG: 1 TABLET ORAL at 09:04

## 2020-03-13 RX ADMIN — PANTOPRAZOLE SODIUM 40 MG: 40 TABLET, DELAYED RELEASE ORAL at 17:19

## 2020-03-13 RX ADMIN — OSELTAMIVIR PHOSPHATE 30 MG: 30 CAPSULE ORAL at 21:47

## 2020-03-13 RX ADMIN — HEPARIN SODIUM 5000 UNITS: 5000 INJECTION, SOLUTION INTRAVENOUS; SUBCUTANEOUS at 09:05

## 2020-03-13 RX ADMIN — SODIUM CHLORIDE 125 ML/HR: 9 INJECTION, SOLUTION INTRAVENOUS at 01:30

## 2020-03-13 RX ADMIN — IPRATROPIUM BROMIDE AND ALBUTEROL SULFATE 3 ML: 2.5; .5 SOLUTION RESPIRATORY (INHALATION) at 08:41

## 2020-03-13 RX ADMIN — ROSUVASTATIN CALCIUM 20 MG: 20 TABLET, COATED ORAL at 09:05

## 2020-03-13 RX ADMIN — ASPIRIN 325 MG ORAL TABLET 325 MG: 325 PILL ORAL at 09:05

## 2020-03-13 RX ADMIN — PANTOPRAZOLE SODIUM 40 MG: 40 TABLET, DELAYED RELEASE ORAL at 07:53

## 2020-03-13 RX ADMIN — GABAPENTIN 300 MG: 300 CAPSULE ORAL at 07:52

## 2020-03-13 RX ADMIN — SODIUM CHLORIDE 125 ML/HR: 9 INJECTION, SOLUTION INTRAVENOUS at 09:03

## 2020-03-13 RX ADMIN — Medication 1 CAPSULE: at 09:04

## 2020-03-13 RX ADMIN — TRAZODONE HYDROCHLORIDE 25 MG: 50 TABLET ORAL at 21:46

## 2020-03-13 RX ADMIN — HEPARIN SODIUM 5000 UNITS: 5000 INJECTION, SOLUTION INTRAVENOUS; SUBCUTANEOUS at 21:46

## 2020-03-13 RX ADMIN — SODIUM CHLORIDE, PRESERVATIVE FREE 10 ML: 5 INJECTION INTRAVENOUS at 22:54

## 2020-03-13 RX ADMIN — MICONAZOLE NITRATE 200 MG: 200 SUPPOSITORY VAGINAL at 21:49

## 2020-03-13 RX ADMIN — SODIUM CHLORIDE 125 ML/HR: 9 INJECTION, SOLUTION INTRAVENOUS at 17:19

## 2020-03-13 RX ADMIN — DOCUSATE SODIUM 100 MG: 100 CAPSULE, LIQUID FILLED ORAL at 09:04

## 2020-03-13 RX ADMIN — NICOTINE 1 PATCH: 21 PATCH, EXTENDED RELEASE TRANSDERMAL at 17:18

## 2020-03-13 RX ADMIN — HYDROCODONE BITARTRATE AND ACETAMINOPHEN 1 TABLET: 7.5; 325 TABLET ORAL at 21:44

## 2020-03-13 RX ADMIN — TIZANIDINE 4 MG: 4 TABLET ORAL at 13:59

## 2020-03-13 RX ADMIN — HYDROCODONE BITARTRATE AND ACETAMINOPHEN 1 TABLET: 7.5; 325 TABLET ORAL at 15:22

## 2020-03-13 RX ADMIN — CEFTRIAXONE SODIUM 1 G: 1 INJECTION, POWDER, FOR SOLUTION INTRAMUSCULAR; INTRAVENOUS at 17:18

## 2020-03-13 NOTE — PLAN OF CARE
Alert and oriented.  Napped intermittent.  Respirations unlabored.  Attempted room air, but sat decreased to 84% while asleep.  Refused CPAP, sat adequate on 2L cannula.  SR monitor.  PRN Norco twice for chronic pain.  Up to recliner and bedside commode 1 assist/walker.  Daughter bedside entire shift.

## 2020-03-13 NOTE — PROGRESS NOTES
Continued Stay Note  [facility]     Patient Name: Lexii Duckworth  MRN: [id number]  Today's Date: 3/13/2020    Admit Date: 3/11/2020    Discharge Plan     Row Name 03/13/20 1516       Plan    Plan Comments  IRENE called [employer] to see if pt elligible for Medicaid transportation [phone]. Pt is ellgible and already utilizes [employer]. If pt discharges over the weekend, [facility] only open until 1:00pm on Saturday and pt will need to be ready to leave for discharge by 12:00pm on Saturday. Pt plans to discharge back to [facility] when medically ready.     Row Name 03/13/20 1089       Plan    Plan  Return to [facility]    Patient/Family in Agreement with Plan  yes    Plan Comments  Spoke with patient in room.  Her plan is still to return to [facility] at discharge.  Therapy recommending a rollator.  Order called to [facility] with [employer].  DME will be delivered to patient's bedside prior to discharge.  Patient will need assistance with transportation at discharge.  Will ask  Adrianne to look into Medicaid transportation.  Patient denies any other needs.  CM will continue to follow.  Lea Bailey RN [phone]    Final Discharge Disposition Code  01 - home or self-care        Discharge Codes    No documentation.       Expected Discharge Date and Time     Expected Discharge Date Expected Discharge Time    Mar 15, 2020             HARSHIL Floyd

## 2020-03-13 NOTE — THERAPY EVALUATION
Acute Care - Occupational Therapy Initial Evaluation  [facility]     Patient Name: Lexii Duckworth  : 1960  MRN: [id number]  Today's Date: 3/13/2020     Date of Referral to OT: 20       Admit Date: 3/11/2020       ICD-10-CM ICD-9-CM   1. Influenza A J10.1 487.1   2. Syncope and collapse R55 780.2   3. Hypotension, unspecified hypotension type I95.9 458.9   4. History of COPD Z87.09 V12.69   5. Homelessness Z59.0 V60.0   6. History of back pain Z87.39 V13.59     Patient Active Problem List   Diagnosis   • Anxiety disorder due to general medical condition   • Cerebral infarction (CMS/HCC)   • Fatigue   • Obstructive sleep apnea   • Sore muscles   • Spondylosis of cervical region without myelopathy or radiculopathy   • Fibromyalgia   • Syncope   • Headache   • Numbness   • Difficulty walking   • Dizziness   • Chronic neck pain   • Sequelae, post-stroke   • Pruritus   • Stage 3 chronic kidney disease (CMS/HCC)   • Influenza A   • Dehydration     Past Medical History:   Diagnosis Date   • Anxiety    • Back pain    • Chronic kidney disease    • COPD (chronic obstructive pulmonary disease) (CMS/HCC)    • Depression    • Fibromyalgia    • Hyperlipidemia    • Hypertension    • Migraine headache    • Neck pain    • Osteoarthritis    • Stroke (CMS/HCC)     left hemispheric (cortical) stroke     Past Surgical History:   Procedure Laterality Date   • CAROTID ENDARTERECTOMY Left    • HAND SURGERY     • TUBAL ABDOMINAL LIGATION            OT ASSESSMENT FLOWSHEET (last 12 hours)      Occupational Therapy Evaluation     Row Name 20 1308                   OT Evaluation Time/Intention    Subjective Information  complains of;weakness;fatigue;pain  -KF        Document Type  evaluation  -KF        Mode of Treatment  occupational therapy  -KF        Patient Effort  good  -KF        Symptoms Noted During/After Treatment  none  -KF           General Information    Patient Profile Reviewed?  yes  -KF        Patient  Observations  alert;cooperative;agree to therapy  -KF        Prior Level of Function  independent:;all household mobility;transfer;ADL's;bed mobility;min assist:;using stairs  -KF        Equipment Currently Used at Home  none  -KF        Existing Precautions/Restrictions  fall;other (see comments) droplet  -KF        Equipment Issued to Patient  reacher;sock aid  -KF        Risks Reviewed  patient:;LOB;nausea/vomiting;dizziness;increased discomfort  -KF        Benefits Reviewed  patient:;improve function;increase independence;increase strength;increase balance;decrease pain;increase knowledge  -KF        Barriers to Rehab  environmental barriers  -KF           Relationship/Environment    Primary Source of Support/Comfort  child(heather)  -KF        Lives With  child(heather), adult  -KF        Family Caregiver if Needed  child(heather), adult  -KF        Concerns About Impact on Relationships  [facility] residence   -KF           Resource/Environmental Concerns    Current Living Arrangements  shelter  -KF        Resource/Environmental Concerns  home accessibility  -KF        Home Accessibility Concerns  stairs to access bedroom or bathroom  -KF           Stairs Within Home, Primary    Stairs, Within Home, Primary  -- 1 flight to access sleeping area  -KF           Cognitive Assessment/Interventions    Additional Documentation  Cognitive Assessment/Intervention (Group)  -KF           Cognitive Assessment/Intervention- PT/OT    Affect/Mental Status (Cognitive)  WFL  -KF        Orientation Status (Cognition)  oriented x 3  -KF        Follows Commands (Cognition)  WFL;follows one step commands;over 90% accuracy  -KF        Cognitive Function (Cognitive)  safety deficit  -KF        Safety Deficit (Cognitive)  mild deficit;insight into deficits/self awareness;judgment;safety precautions awareness  -KF        Cognitive Interventions (Cognitive)  occupation/activity based interventions;process/task specific training  -KF            Safety Issues, Functional Mobility    Safety Issues Affecting Function (Mobility)  sequencing abilities;safety precaution awareness;insight into deficits/self awareness  -        Impairments Affecting Function (Mobility)  balance;endurance/activity tolerance;strength;pain;postural/trunk control  -KF           Bed Mobility Assessment/Treatment    Comment (Bed Mobility)  UIC  -KF           Functional Mobility    Functional Mobility- Comment  deferred to PT   -KF           Transfer Assessment/Treatment    Transfer Assessment/Treatment  sit-stand transfer;stand-sit transfer  -        Comment (Transfers)  cues for safe seq at FWW  -KF           Sit-Stand Transfer    Sit-Stand [place] (Transfers)  contact guard;verbal cues  -        Assistive Device (Sit-Stand Transfers)  walker, front-wheeled  -           Stand-Sit Transfer    Stand-Sit [place] (Transfers)  stand by assist  -        Assistive Device (Stand-Sit Transfers)  walker, front-wheeled  -           ADL Assessment/Intervention    BADL Assessment/Intervention  lower body dressing;upper body dressing  -           Upper Body Dressing Assessment/Training    Upper Body Dressing [place] Level  don;front opening garment;minimum assist (75% patient effort)  -        Upper Body Dressing Position  unsupported sitting  -        Comment (Upper Body Dressing)  from chair, assist for line management   -           Lower Body Dressing Assessment/Training    Lower Body Dressing [place] Level  don;doff;socks;moderate assist (50% patient effort);verbal cues;nonverbal cues (demo/gesture)  -        Assistive Devices (Lower Body Dressing)  reacher;sock-aid  -        Lower Body Dressing Position  unsupported sitting  -        Comment (Lower Body Dressing)  training intiated will cont training   -           BADL Safety/Performance    Impairments, BADL Safety/Performance  balance;endurance/activity tolerance;strength;shortness of  breath  -KF        Skilled BADL Treatment/Intervention  BADL process/adaptation training;cognitive/safety deficit modifications  -KF           General ROM    GENERAL ROM COMMENTS  BUE WFL   -KF           MMT (Manual Muscle Testing)    General MMT Comments  BUE grossly 4-/5   -KF           Motor Assessment/Interventions    Additional Documentation  Balance (Group);Balance Interventions (Group);Gross Motor Coordination (Group)  -KF           Gross Motor Coordination    Gross Motor Impairments  AROM (active range of motion);coordination  -KF        Gross Motor Skill, Impairments Detail  WFL   -KF           Balance    Balance  static sitting balance;static standing balance;dynamic sitting balance;dynamic standing balance  -KF           Static Sitting Balance    Level of [place] (Unsupported Sitting, Static Balance)  independent  -KF        Sitting Position (Unsupported Sitting, Static Balance)  sitting in chair  -KF        Time Able to Maintain Position (Unsupported Sitting, Static Balance)  3 to 4 minutes  -KF           Dynamic Sitting Balance    Level of [place], Reaches Outside Midline (Sitting, Dynamic Balance)  supervision  -KF        Sitting Position, Reaches Outside Midline (Sitting, Dynamic Balance)  sitting in chair  -KF           Static Standing Balance    Level of [place] (Supported Standing, Static Balance)  standby assist  -KF        Time Able to Maintain Position (Supported Standing, Static Balance)  1 to 2 minutes  -KF        Assistive Device Utilized (Supported Standing, Static Balance)  walker, rolling  -KF           Dynamic Standing Balance    Level of [place], Reaches Outside Midline (Standing, Dynamic Balance)  contact guard assist  -KF        Time Able to Maintain Position, Reaches Outside Midline (Standing, Dynamic Balance)  1 to 2 minutes  -KF        Assistive Device Utilized (Supported Standing, Dynamic Balance)  walker, rolling  -KF           Sensory Assessment/Intervention     Sensory General Assessment  no sensation deficits identified  -KF           Positioning and Restraints    Pre-Treatment Position  sitting in chair/recliner  -KF        Post Treatment Position  chair  -KF        In Chair  with PT;reclined;sitting;call light within reach;encouraged to call for assist;exit alarm on  -KF           Pain Assessment    Additional Documentation  Pain Scale: Numbers Pre/Post-Treatment (Group)  -KF           Pain Scale: Numbers Pre/Post-Treatment    Pain Scale: Numbers, Pretreatment  4/10  -KF        Pain Scale: Numbers, Post-Treatment  4/10  -KF        Pain Location - Side  Bilateral  -KF        Pain Location - Orientation  lower  -KF        Pain Location  back  -KF        Pain Intervention(s)  Repositioned;Ambulation/increased activity  -KF           Wound 03/11/20 1705 Right anterior knee Abrasion    Wound - Properties Group Date first assessed: 03/11/20  -LJ Time first assessed: 1705  -LJ Present on Hospital Admission: Y  -LJ Side: Right  -LJ Orientation: anterior  -LJ Location: knee  -LJ Primary Wound Type: Abrasion  -LJ       Plan of Care Review    Plan of Care Reviewed With  patient;daughter  -KF        Progress  improving  -KF           Clinical Impression (OT)    Date of Referral to OT  03/11/20  -KF        OT Diagnosis  ADL decline  -KF        Patient/Family Goals Statement (OT Eval)  PLOF   -KF        Criteria for Skilled Therapeutic Interventions Met (OT Eval)  yes;treatment indicated  -KF        Rehab Potential (OT Eval)  good, to achieve stated therapy goals  -KF        Therapy Frequency (OT Eval)  daily  -KF        Care Plan Review (OT)  evaluation/treatment results reviewed;care plan/treatment goals reviewed;risks/benefits reviewed;current/potential barriers reviewed;patient/other agree to care plan  -KF        Care Plan Review, Other Participant (OT Eval)  family  -KF        Anticipated Equipment Needs at Discharge (OT)  rollator;front wheeled walker  -KF         Anticipated Discharge Disposition (OT)  home with assist  -KF           Vital Signs    Pre Systolic BP Rehab  -- VSS no sig change in standing   -KF           Planned OT Interventions    Planned Therapy Interventions (OT Eval)  activity tolerance training;adaptive equipment training;cognitive/visual perception retraining;BADL retraining;functional balance retraining;neuromuscular control/coordination retraining;IADL retraining;occupation/activity based interventions;patient/caregiver education/training;ROM/therapeutic exercise;strengthening exercise;transfer/mobility retraining  -KF           OT Goals    Transfer Goal Selection (OT)  transfer, OT goal 1  -KF        Dressing Goal Selection (OT)  dressing, OT goal 1  -KF        Strength Goal Selection (OT)  strength, OT goal 1  -KF        Additional Documentation  Strength Goal Selection (OT) (Row)  -KF           Transfer Goal 1 (OT)    Activity/Assistive Device (Transfer Goal 1, OT)  bed-to-chair/chair-to-bed;toilet;walker, rolling  -KF        [place] Level/Cues Needed (Transfer Goal 1, OT)  standby assist  -KF        Time Frame (Transfer Goal 1, OT)  long term goal (LTG);by discharge  -KF        Progress/Outcome (Transfer Goal 1, OT)  goal ongoing  -KF           Dressing Goal 1 (OT)    Activity/Assistive Device (Dressing Goal 1, OT)  lower body dressing;reacher;sock-aid socks  -KF        [place]/Cues Needed (Dressing Goal 1, OT)  supervision required  -KF        Time Frame (Dressing Goal 1, OT)  long term goal (LTG);by discharge  -KF        Progress/Outcome (Dressing Goal 1, OT)  goal ongoing  -KF           Strength Goal 1 (OT)    Strength Goal 1 (OT)  Demo I with written HEP with mild resistance to improve elbow flexion/extension   -KF        Time Frame (Strength Goal 1, OT)  long term goal (LTG);by discharge  -KF        Progress/Outcome (Strength Goal 1, OT)  goal ongoing  -KF           Living Environment    Home Accessibility  stairs within home  -KF           User Key  (r) = Recorded By, (t) = Taken By, (c) = Cosigned By    Initials Name Effective Dates    Monica Becerra OT 04/03/18 -     Tiny Mejía RN 06/25/19 -                OT Recommendation and Plan  Outcome Summary/Treatment Plan (OT)  Anticipated Equipment Needs at Discharge (OT): rollator, front wheeled walker  Anticipated Discharge Disposition (OT): home with assist  Planned Therapy Interventions (OT Eval): activity tolerance training, adaptive equipment training, cognitive/visual perception retraining, BADL retraining, functional balance retraining, neuromuscular control/coordination retraining, IADL retraining, occupation/activity based interventions, patient/caregiver education/training, ROM/therapeutic exercise, strengthening exercise, transfer/mobility retraining  Therapy Frequency (OT Eval): daily  Plan of Care Review  Plan of Care Reviewed With: patient, daughter  Plan of Care Reviewed With: patient, daughter  Outcome Summary: OT eval completed, Pt CGA dynamic standing balance, SBA static standing balance at FWW, mod A socks AE provided for training, recom home with rollator vs FWW pending PT recom and assist    Outcome Measures     Row Name 03/13/20 1308             How much help from another is currently needed...    Putting on and taking off regular lower body clothing?  3  -KF      Bathing (including washing, rinsing, and drying)  3  -KF      Toileting (which includes using toilet bed pan or urinal)  3  -KF      Putting on and taking off regular upper body clothing  4  -KF      Taking care of personal grooming (such as brushing teeth)  4  -KF      Eating meals  4  -KF      AM-PAC 6 Clicks Score (OT)  21  -KF         Functional Assessment    Outcome Measure Options  AM-PAC 6 Clicks Daily Activity (OT)  -KF        User Key  (r) = Recorded By, (t) = Taken By, (c) = Cosigned By    Initials Name Provider Type    KF Monica Salazar, OT Occupational Therapist          Time  Calculation:   Time Calculation- OT     Row Name 03/13/20 1308             Time Calculation- OT    OT Start Time  1308  -KF      Total Timed Code Minutes- OT  0 minute(s)  -KF      OT Received On  03/13/20  -KF      OT Goal Re-Cert Due Date  03/23/20  -KF        User Key  (r) = Recorded By, (t) = Taken By, (c) = Cosigned By    Initials Name Provider Type    KF Monica Salazar, OT Occupational Therapist        Therapy Charges for Today     Code Description Service Date Service Provider Modifiers Qty    59054501546 HC OT EVAL MOD COMPLEXITY 4 3/13/2020 Monica Salazar OT GO 1               Monica Salazar OT  3/13/2020

## 2020-03-13 NOTE — PLAN OF CARE
Problem: Patient Care Overview  Goal: Plan of Care Review  Flowsheets (Taken 3/13/2020 8667)  Outcome Summary: OT eval completed, Pt CGA dynamic standing balance, SBA static standing balance at FWW, mod A socks AE provided for training, recom home with assist and rollator vs FWW pending PT recom

## 2020-03-13 NOTE — PROGRESS NOTES
Continued Stay Note  [facility]     Patient Name: Lexii Duckworth  MRN: [id number]  Today's Date: 3/13/2020    Admit Date: 3/11/2020    Discharge Plan     Row Name 03/13/20 1439       Plan    Plan  Return to [facility]    Patient/Family in Agreement with Plan  yes    Plan Comments  Spoke with patient in room.  Her plan is still to return to [facility] at discharge.  Therapy recommending a rollator.  Order called to [facility] with [employer].  DME will be delivered to patient's bedside prior to discharge.  Patient will need assistance with transportation at discharge.  Will ask  Adrianne to look into Medicaid transportation.  Patient denies any other needs.  CM will continue to follow.  Lea Bailey RN [phone]    Final Discharge Disposition Code  01 - home or self-care        Discharge Codes    No documentation.       Expected Discharge Date and Time     Expected Discharge Date Expected Discharge Time    Mar 15, 2020             Lea Bailey RN

## 2020-03-13 NOTE — PROGRESS NOTES
[facility] Medicine Services  PROGRESS NOTE    Patient Name: Lexii Duckworth  : 1960  MRN: [id number]    Date of Admission: 3/11/2020  Primary Care Physician: Fausto Valentin MD    Subjective   Subjective     CC: Does not feel well    HPI: Called by nursing for patient who wants her pain medicine restarted.  Her blood pressure today is marginal and she is dehydrated.  We will increase her IV fluid infusion rate.  Daughter in room today.  She does not sleep well    Review of Systems    Gen- No fevers, chills  CV- No chest pain, palpitations  Resp- No cough, dyspnea  GI- No N/V/D, abd pain      Objective   Objective     Vital Signs:   Temp:  [96.4 °F (35.8 °C)-99.2 °F (37.3 °C)] 97 °F (36.1 °C)  Heart Rate:  [] 92  Resp:  [16-20] 20  BP: ()/(52-78) 109/61        Physical Exam:    Constitutional: No acute distress, awake, alert  HENT: NCAT, dry tongue  Respiratory: Clear to auscultation bilaterally, respiratory effort normal   Cardiovascular: RRR, no murmurs, rubs, or gallops, palpable pedal pulses bilaterally  Gastrointestinal: Positive bowel sounds, soft, nontender, nondistended  Musculoskeletal: No bilateral ankle edema  Psychiatric: Appropriate affect, cooperative  Neurologic: Oriented x 3, strength symmetric in all extremities, Cranial Nerves grossly intact to confrontation, speech clear  Skin: dry, + skin tenting    Results Reviewed:  Results from last 7 days   Lab Units 20  0240 20  1201   WBC 10*3/mm3 3.89 4.58   HEMOGLOBIN g/dL 10.5* 11.9*   HEMATOCRIT % 36.2 38.9   PLATELETS 10*3/mm3 114* 150   PROCALCITONIN ng/mL  --  0.43*     Results from last 7 days   Lab Units 20  0240 20  1201   SODIUM mmol/L 143 136   POTASSIUM mmol/L 4.3 4.0   CHLORIDE mmol/L 111* 97*   CO2 mmol/L 23.0 23.0   BUN mg/dL 28* 35*   CREATININE mg/dL 1.32* 1.73*   GLUCOSE mg/dL 74 100*   CALCIUM mg/dL 7.5* 8.6   ALT (SGPT) U/L 37* 51*   AST (SGOT) U/L 47* 73*   TROPONIN  T ng/mL  --  <0.010   PROBNP pg/mL  --  425.0     Estimated Creatinine Clearance: 49 mL/min (A) (by C-G formula based on SCr of 1.32 mg/dL (H)).    Microbiology Results Abnormal     Procedure Component Value - Date/Time    Blood Culture - Blood, Arm, Right [[id number]] Collected:  03/11/20 1202    Lab Status:  Preliminary result Specimen:  Blood from Arm, Right Updated:  03/12/20 1245     Blood Culture No growth at 24 hours    Blood Culture - Blood, Arm, Right [[id number]] Collected:  03/11/20 1155    Lab Status:  Preliminary result Specimen:  Blood from Arm, Right Updated:  03/12/20 1245     Blood Culture No growth at 24 hours    Urine Culture - Urine, Urine, Catheter [[id number]]  (Normal) Collected:  03/11/20 1150    Lab Status:  Preliminary result Specimen:  Urine, Catheter Updated:  03/12/20 1019     Urine Culture No growth    Respiratory Panel, PCR - Swab, Nasopharynx [[id number]]  (Abnormal) Collected:  03/11/20 1240    Lab Status:  Final result Specimen:  Swab from Nasopharynx Updated:  03/11/20 1438     ADENOVIRUS, PCR Not Detected     Coronavirus 229E Not Detected     Coronavirus HKU1 Not Detected     Coronavirus NL63 Not Detected     Coronavirus OC43 Not Detected     Human Metapneumovirus Not Detected     Human Rhinovirus/Enterovirus Not Detected     Influenza B PCR Not Detected     Parainfluenza Virus 1 Not Detected     Parainfluenza Virus 2 Not Detected     Parainfluenza Virus 3 Not Detected     Parainfluenza Virus 4 Not Detected     Bordetella pertussis pcr Not Detected     Influenza A H1 2009 PCR Detected     Chlamydophila pneumoniae PCR Not Detected     Mycoplasma pneumo by PCR Not Detected     Influenza A H3 Not Detected     Influenza A H1 Not Detected     RSV, PCR Not Detected     Bordetella parapertussis PCR Not Detected    Narrative:       The coronavirus on the RVP is NOT COVID-19 and is NOT indicative of infection with COVID-19.           Imaging Results (Last 24 Hours)     ** No results found for  the last 24 hours. **               I have reviewed the medications:  Scheduled Meds:  aspirin 325 mg Oral Daily   azithromycin 500 mg Intravenous Q24H   cefTRIAXone 1 g Intravenous Q24H   docusate sodium 100 mg Oral Daily   folic acid 1 mg Oral Daily   gabapentin 300 mg Oral Q8H   heparin (porcine) 5,000 Units Subcutaneous Q12H   ipratropium-albuterol 3 mL Nebulization 4x Daily - RT   lactobacillus acidophilus 1 capsule Oral Daily   melatonin 5 mg Oral Nightly   miconazole 200 mg Vaginal Nightly   nicotine 1 patch Transdermal Q24H   oseltamivir 30 mg Oral Q12H   pantoprazole 40 mg Oral BID AC   rosuvastatin 20 mg Oral Daily   sodium chloride 10 mL Intravenous Q12H   traZODone 25 mg Oral Nightly   venlafaxine  mg Oral Daily     Continuous Infusions:  sodium chloride 125 mL/hr Last Rate: 125 mL/hr (03/12/20 1841)     PRN Meds:.•  albuterol  •  calcium gluconate IVPB **AND** calcium gluconate IVPB **AND** Calcium, Ionized  •  HYDROcodone-acetaminophen  •  influenza vaccine  •  magnesium sulfate **OR** magnesium sulfate in D5W 1g/100mL (PREMIX) **OR** magnesium sulfate  •  ondansetron  •  potassium & sodium phosphates  •  potassium chloride **OR** potassium chloride **OR** potassium chloride  •  sodium chloride  •  sodium chloride    Assessment/Plan   Assessment & Plan     Active Hospital Problems    Diagnosis  POA   • Dehydration [E86.0]  Yes   • Stage 3 chronic kidney disease (CMS/HCC) [N18.3]  Unknown   • Influenza A [J10.1]  Unknown   • 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        Brief Hospital Course to date:  Lexii Duckworth is a 59 y.o. female who presented by ambulance for not feeling good    Admitted for not feeling good  -Clinically dehydrated  -On longstanding pain medicine  -Hypotensive  today  Dehydration  -Increase IV fluid infusion rate today  Chronic pain  -Asking to restart pain medicine today  Syncope  -Likely due to dehydration  -Continue IV fluids as she is still dehydrated  Influenza A  -Droplet  COPD  -Nebs  Chronic kidney disease  -Careful with too much analgesia    DVT Prophylaxis: Subcu heparin    Disposition: I expect the patient to be discharged TBD    CODE STATUS:   Code Status and Medical Interventions:   Ordered at: 03/11/20 1642     Level Of Support Discussed With:    Patient     Code Status:    CPR     Medical Interventions (Level of Support Prior to Arrest):    Full         Electronically signed by Emanuel Moseley MD, 03/12/20, 20:30.

## 2020-03-13 NOTE — PLAN OF CARE
Problem: Patient Care Overview  Goal: Plan of Care Review  Outcome: Ongoing (interventions implemented as appropriate)  Flowsheets (Taken 3/13/2020 7934)  Plan of Care Reviewed With: patient;daughter  Outcome Summary: PT PRESENTS WITH EVOLVING SYMPOMS TO INCLUDE IMPAIRED BALANCE, GENERALIZED WEAKNESS, DECREASED ENDURANCE AND DECLINE IN FUNCTIONAL MOBILITY. ORTHOSTATIC BP STABLE. PT SOA WITH ACTIVITY ON 3L O2. RECOMMEND HOME WITH ROLLATOR AND POSSIBLY OPPT IF SYMPTOMS PERSIST AND TRANSPORTATION AVAILABLE. PT HAS CHRONIC LBP AND LIMITED MOBILITY.

## 2020-03-13 NOTE — THERAPY EVALUATION
Patient Name: Lexii Duckworth  : 1960    MRN: [id number]                              Today's Date: 3/13/2020       Admit Date: 3/11/2020    Visit Dx:     ICD-10-CM ICD-9-CM   1. Influenza A J10.1 487.1   2. Syncope and collapse R55 780.2   3. Hypotension, unspecified hypotension type I95.9 458.9   4. History of COPD Z87.09 V12.69   5. Homelessness Z59.0 V60.0   6. History of back pain Z87.39 V13.59     Patient Active Problem List   Diagnosis   • Anxiety disorder due to general medical condition   • Cerebral infarction (CMS/HCC)   • Fatigue   • Obstructive sleep apnea   • Sore muscles   • Spondylosis of cervical region without myelopathy or radiculopathy   • Fibromyalgia   • Syncope   • Headache   • Numbness   • Difficulty walking   • Dizziness   • Chronic neck pain   • Sequelae, post-stroke   • Pruritus   • Stage 3 chronic kidney disease (CMS/HCC)   • Influenza A   • Dehydration     Past Medical History:   Diagnosis Date   • Anxiety    • Back pain    • Chronic kidney disease    • COPD (chronic obstructive pulmonary disease) (CMS/HCC)    • Depression    • Fibromyalgia    • Hyperlipidemia    • Hypertension    • Migraine headache    • Neck pain    • Osteoarthritis    • Stroke (CMS/HCC)     left hemispheric (cortical) stroke     Past Surgical History:   Procedure Laterality Date   • CAROTID ENDARTERECTOMY Left    • HAND SURGERY     • TUBAL ABDOMINAL LIGATION       General Information     Row Name 20 1345          PT Evaluation Time/Intention    Document Type  evaluation  -CD     Mode of Treatment  physical therapy  -CD     Row Name 20 1345          General Information    Patient Profile Reviewed?  yes  -CD     Prior Level of Function  independent:;all household mobility;community mobility;ADL's;using stairs;gait;transfer Climbs [facility] and up into a bunk  bed.   -CD     Existing Precautions/Restrictions  (S) fall;oxygen therapy device and L/min DROPLET PRECAUTIONS- FLU A.   -CD      Row Name 03/13/20 1345          Relationship/Environment    Lives With  -- PT AND DTR LIVE AT [facility].   -CD     Row Name 03/13/20 1345          Resource/Environmental Concerns    Current Living Arrangements  homeless  -CD     Row Name 03/13/20 1345          Stairs Within Home, Primary    Number of Stairs, Within Home, Primary  ten  -CD     Stair Railings, Within Home, Primary  railings safe and in good condition  -CD     Row Name 03/13/20 1345          Cognitive Assessment/Intervention- PT/OT    Orientation Status (Cognition)  oriented x 3  -CD     Row Name 03/13/20 1345          Safety Issues, Functional Mobility    Safety Issues Affecting Function (Mobility)  awareness of need for assistance;insight into deficits/self awareness;safety precaution awareness;safety precautions follow-through/compliance  -CD     Impairments Affecting Function (Mobility)  balance;endurance/activity tolerance;shortness of breath;strength  -CD       User Key  (r) = Recorded By, (t) = Taken By, (c) = Cosigned By    Initials Name Provider Type    CD Yamileth Harrison, PT Physical Therapist        Mobility     Row Name 03/13/20 1347          Bed Mobility Assessment/Treatment    Comment (Bed Mobility)  PT UIC.   -CD     Row Name 03/13/20 1347          Transfer Assessment/Treatment    Comment (Transfers)  CUES FOR HAND PLACEMENT, C/O LBP IN STANDING.   -CD     Row Name 03/13/20 1347          Sit-Stand Transfer    Sit-Stand Media (Transfers)  contact guard  -CD     Assistive Device (Sit-Stand Transfers)  walker, front-wheeled  -CD     Row Name 03/13/20 1347          Gait/Stairs Assessment/Training    Gait/Stairs Assessment/Training  gait/ambulation independence  -CD     Media Level (Gait)  contact guard  -CD     Assistive Device (Gait)  walker, front-wheeled  -CD     Pattern (Gait)  step-through  -CD     Deviations/Abnormal Patterns (Gait)  gait speed decreased;stride length decreased  -CD     Bilateral Gait Deviations   forward flexed posture;heel strike decreased  -CD     Comment (Gait/Stairs)  DISTANCE LIMITED BY FATIGUE, WEAKNESS. PT REPORTS ONLY LIMITED DISTANCE AT BASELINE DUE TO BACK PAIN.   -CD       User Key  (r) = Recorded By, (t) = Taken By, (c) = Cosigned By    Initials Name Provider Type    CD Yamileth Harrison, PT Physical Therapist        Obj/Interventions     Row Name 03/13/20 1349          General ROM    GENERAL ROM COMMENTS  B LE AROM WFL'S   -CD     Row Name 03/13/20 1349          MMT (Manual Muscle Testing)    General MMT Comments  GROSSLY 3+ to 4/5 AND SYMMETRICAL.   -CD     Row Name 03/13/20 1349          Therapeutic Exercise    Lower Extremity (Therapeutic Exercise)  LAQ (long arc quad), bilateral;marching while seated  -CD     Lower Extremity Range of Motion (Therapeutic Exercise)  ankle dorsiflexion/plantar flexion, bilateral  -CD     Position (Therapeutic Exercise)  seated  -CD     Sets/Reps (Therapeutic Exercise)  1 SET OF 10 REPS, RECIPROCAL. MILDLY ATAXIC WITH ROM EXERCISE.   -CD     Row Name 03/13/20 1349          Static Sitting Balance    Level of [place] (Unsupported Sitting, Static Balance)  supervision  -CD     Sitting Position (Unsupported Sitting, Static Balance)  sitting in chair EDGE OF RECLINER.   -CD     Row Name 03/13/20 1349          Static Standing Balance    Level of [place] (Supported Standing, Static Balance)  contact guard assist  -CD     Time Able to Maintain Position (Supported Standing, Static Balance)  1 to 2 minutes  -CD     Assistive Device Utilized (Supported Standing, Static Balance)  walker, rolling  -CD     Comment (Supported Standing, Static Balance)  STOOD AT WALKER FOR ORTHOSTATIC BP, DENIED DIZZINESS.   -CD     Row Name 03/13/20 1349          Dynamic Standing Balance    Level of [place], Reaches Outside Midline (Standing, Dynamic Balance)  contact guard assist  -CD     Assistive Device Utilized (Supported Standing, Dynamic Balance)  walker, rolling  -CD      Comment, Reaches Outside Midline (Standing, Dynamic Balance)  GAIT IN ROOM.   -CD       User Key  (r) = Recorded By, (t) = Taken By, (c) = Cosigned By    Initials Name Provider Type    CD Yamileth Harrison, PT Physical Therapist        Goals/Plan     Row Name 03/13/20 1357          Bed Mobility Goal 1 (PT)    [place] Level/Cues Needed (Bed Mobility Goal 1, PT)  independent  -CD     Time Frame (Bed Mobility Goal 1, PT)  long term goal (LTG);2 weeks  -CD     Row Name 03/13/20 4445          Transfer Goal 1 (PT)    Activity/Assistive Device (Transfer Goal 1, PT)  sit-to-stand/stand-to-sit;bed-to-chair/chair-to-bed  -CD     [place] Level/Cues Needed (Transfer Goal 1, PT)  independent  -CD     Time Frame (Transfer Goal 1, PT)  long term goal (LTG);2 weeks  -CD     Row Name 03/13/20 7804          Gait Training Goal 1 (PT)    Activity/Assistive Device (Gait Training Goal 1, PT)  gait (walking locomotion);walker, rolling;other (see comments) ROLLATOR.   -CD     [place] Level (Gait Training Goal 1, PT)  independent  -CD     Distance (Gait Goal 1, PT)  500 FEET.   -CD     Time Frame (Gait Training Goal 1, PT)  long term goal (LTG);2 weeks  -CD     Row Name 03/13/20 2604          Stairs Goal 1 (PT)    Activity/Assistive Device (Stairs Goal 1, PT)  ascending stairs;descending stairs;using handrail, right  -CD     [place] Level/Cues Needed (Stairs Goal 1, PT)  supervision required  -CD     Number of Stairs (Stairs Goal 1, PT)  12  -CD     Time Frame (Stairs Goal 1, PT)  long term goal (LTG);2 weeks  -CD       User Key  (r) = Recorded By, (t) = Taken By, (c) = Cosigned By    Initials Name Provider Type    CD Yamileth Harrison PT Physical Therapist        Clinical Impression     Row Name 03/13/20 7141          Pain Assessment    Additional Documentation  Pain Scale: FACES Pre/Post-Treatment (Group)  -CD     Row Name 03/13/20 3307          Pain Scale: FACES Pre/Post-Treatment    Pain: FACES Scale, Pretreatment   4-->hurts little more  -CD     Pain: FACES Scale, Post-Treatment  4-->hurts little more  -CD     Row Name 03/13/20 1355          Plan of Care Review    Plan of Care Reviewed With  patient;daughter  -CD     Outcome Summary  PT PRESENTS WITH EVOLVING SYMPOMS TO INCLUDE IMPAIRED BALANCE, GENERALIZED WEAKNESS, DECREASED ENDURANCE AND DECLINE IN FUNCTIONAL MOBILITY. ORTHOSTATIC BP STABLE. PT SOA WITH ACTIVITY ON 3L O2. RECOMMEND HOME WITH ROLLATOR AND POSSIBLY OPPT IF SYMPTOMS PERSIST AND TRANSPORTATION AVAILABLE. PT HAS CHRONIC LBP AND LIMITED MOBILITY.   -CD     Row Name 03/13/20 1359          Physical Therapy Clinical Impression    Patient/Family Goals Statement (PT Clinical Impression)  TO FEEL BETTER.   -CD     Criteria for Skilled Interventions Met (PT Clinical Impression)  yes  -CD     Rehab Potential (PT Clinical Summary)  good, to achieve stated therapy goals  -CD     Predicted Duration of Therapy (PT)  2 WEEKS.   -CD     Row Name 03/13/20 135          Vital Signs    Pre Systolic BP Rehab  -- VSS WITH SIT TO STAND TRANSITION WITH NO C/O DIZZINESS.   -CD     Pre SpO2 (%)  92  -CD     O2 Delivery Pre Treatment  supplemental O2  -CD     O2 Delivery Intra Treatment  supplemental O2  -CD     Post SpO2 (%)  97  -CD     O2 Delivery Post Treatment  supplemental O2  -CD     Pre Patient Position  Sitting  -CD     Intra Patient Position  Standing  -CD     Post Patient Position  Sitting  -CD     Row Name 03/13/20 6595          Positioning and Restraints    Pre-Treatment Position  sitting in chair/recliner  -CD     Post Treatment Position  chair  -CD     In Chair  reclined;call light within reach;encouraged to call for assist;exit alarm on;with family/caregiver;legs elevated;notified nsg  -CD       User Key  (r) = Recorded By, (t) = Taken By, (c) = Cosigned By    Initials Name Provider Type    Yamileth Combs, PT Physical Therapist        Outcome Measures     Row Name 03/13/20 5374          How much help from another  person do you currently need...    Turning from your back to your side while in flat bed without using bedrails?  3  -CD     Moving from lying on back to sitting on the side of a flat bed without bedrails?  3  -CD     Moving to and from a bed to a chair (including a wheelchair)?  3  -CD     Standing up from a chair using your arms (e.g., wheelchair, bedside chair)?  3  -CD     Climbing 3-5 steps with a railing?  2  -CD     To walk in hospital room?  3  -CD     AM-PAC 6 Clicks Score (PT)  17  -CD     Row Name 03/13/20 4429          Functional Assessment    Outcome Measure Options  AM-PAC 6 Clicks Basic Mobility (PT)  -CD       User Key  (r) = Recorded By, (t) = Taken By, (c) = Cosigned By    Initials Name Provider Type    Yamileth Combs PT Physical Therapist          PT Recommendation and Plan  Planned Therapy Interventions (PT Eval): balance training, bed mobility training, patient/family education, transfer training, strengthening, home exercise program  Outcome Summary/Treatment Plan (PT)  Anticipated Equipment Needs at Discharge (PT): rollator  Anticipated Discharge Disposition (PT): home with assist, home with OP services  Plan of Care Reviewed With: patient, daughter  Outcome Summary: PT PRESENTS WITH EVOLVING SYMPOMS TO INCLUDE IMPAIRED BALANCE, GENERALIZED WEAKNESS, DECREASED ENDURANCE AND DECLINE IN FUNCTIONAL MOBILITY. ORTHOSTATIC BP STABLE. PT SOA WITH ACTIVITY ON 3L O2. RECOMMEND HOME WITH ROLLATOR AND POSSIBLY OPPT IF SYMPTOMS PERSIST AND TRANSPORTATION AVAILABLE. PT HAS CHRONIC LBP AND LIMITED MOBILITY.      Time Calculation:   PT Charges     Row Name 03/13/20 1402             Time Calculation    Start Time  1315  -CD      PT Received On  03/13/20  -CD      PT Goal Re-Cert Due Date  03/23/20  -CD        User Key  (r) = Recorded By, (t) = Taken By, (c) = Cosigned By    Initials Name Provider Type    Yamileth Combs PT Physical Therapist        Therapy Charges for Today     Code Description Service  Date Service Provider Modifiers Qty    11139289114 HC PT EVAL LOW COMPLEXITY 4 3/13/2020 Yamileth Harrison, PT GP 1          PT G-Codes  Outcome Measure Options: AM-PAC 6 Clicks Basic Mobility (PT)  AM-PAC 6 Clicks Score (PT): 17  AM-PAC 6 Clicks Score (OT): 21    Yamileth Harrison, PT  3/13/2020

## 2020-03-13 NOTE — PROGRESS NOTES
[facility] Medicine Services  PROGRESS NOTE    Patient Name: Lexii Duckworth  : 1960  MRN: [id number]    Date of Admission: 3/11/2020  Primary Care Physician: Fausto Valentin MD    Subjective   Subjective     CC:   Wants her gabapentin increased    HPI:   Told by nursing that patient wants multiple medicines increase.  She wants her gabapentin increased to higher dose.  This was decreased on admission to protect kidneys.  She wanted her pain medicine increased to from 5-7.5    Review of Systems    Gen- No fevers, chills  CV- No chest pain, palpitations  Resp- No cough, dyspnea  GI- No N/V/D, abd pain    Objective   Objective     Vital Signs:   Temp:  [97 °F (36.1 °C)-97.6 °F (36.4 °C)] 97.3 °F (36.3 °C)  Heart Rate:  [] 69  Resp:  [20-22] 20  BP: ()/(55-88) 99/55     Physical Exam:    Constitutional: No acute distress, awake, alert  HENT: NCAT, dry tongue  Respiratory: Poor inspiratory effort, junky breath sounds  Cardiovascular: RRR, no murmurs, rubs, or gallops, palpable pedal pulses bilaterally  Gastrointestinal: Positive bowel sounds, soft, nontender, nondistended  Musculoskeletal: No bilateral ankle edema  Psychiatric: Appropriate affect, cooperative  Neurologic: Oriented x 3, strength symmetric in all extremities, Cranial Nerves grossly intact to confrontation, speech clear  Skin: Dry, positive for skin tenting    Results Reviewed:  Results from last 7 days   Lab Units 20  0240 20  1201   WBC 10*3/mm3 3.89 4.58   HEMOGLOBIN g/dL 10.5* 11.9*   HEMATOCRIT % 36.2 38.9   PLATELETS 10*3/mm3 114* 150   PROCALCITONIN ng/mL  --  0.43*     Results from last 7 days   Lab Units 20  0240 20  1201   SODIUM mmol/L 143 136   POTASSIUM mmol/L 4.3 4.0   CHLORIDE mmol/L 111* 97*   CO2 mmol/L 23.0 23.0   BUN mg/dL 28* 35*   CREATININE mg/dL 1.32* 1.73*   GLUCOSE mg/dL 74 100*   CALCIUM mg/dL 7.5* 8.6   ALT (SGPT) U/L 37* 51*   AST (SGOT) U/L 47* 73*   TROPONIN  T ng/mL  --  <0.010   PROBNP pg/mL  --  425.0     Estimated Creatinine Clearance: 49 mL/min (A) (by C-G formula based on SCr of 1.32 mg/dL (H)).    Microbiology Results Abnormal     Procedure Component Value - Date/Time    Blood Culture - Blood, Arm, Right [[id number]] Collected:  03/11/20 1202    Lab Status:  Preliminary result Specimen:  Blood from Arm, Right Updated:  03/13/20 1245     Blood Culture No growth at 2 days    Blood Culture - Blood, Arm, Right [[id number]] Collected:  03/11/20 1155    Lab Status:  Preliminary result Specimen:  Blood from Arm, Right Updated:  03/13/20 1245     Blood Culture No growth at 2 days    Urine Culture - Urine, Urine, Catheter [[id number]]  (Normal) Collected:  03/11/20 1150    Lab Status:  Final result Specimen:  Urine, Catheter Updated:  03/13/20 0403     Urine Culture No growth    Respiratory Panel, PCR - Swab, Nasopharynx [[id number]]  (Abnormal) Collected:  03/11/20 1240    Lab Status:  Final result Specimen:  Swab from Nasopharynx Updated:  03/11/20 1438     ADENOVIRUS, PCR Not Detected     Coronavirus 229E Not Detected     Coronavirus HKU1 Not Detected     Coronavirus NL63 Not Detected     Coronavirus OC43 Not Detected     Human Metapneumovirus Not Detected     Human Rhinovirus/Enterovirus Not Detected     Influenza B PCR Not Detected     Parainfluenza Virus 1 Not Detected     Parainfluenza Virus 2 Not Detected     Parainfluenza Virus 3 Not Detected     Parainfluenza Virus 4 Not Detected     Bordetella pertussis pcr Not Detected     Influenza A H1 2009 PCR Detected     Chlamydophila pneumoniae PCR Not Detected     Mycoplasma pneumo by PCR Not Detected     Influenza A H3 Not Detected     Influenza A H1 Not Detected     RSV, PCR Not Detected     Bordetella parapertussis PCR Not Detected    Narrative:       The coronavirus on the RVP is NOT COVID-19 and is NOT indicative of infection with COVID-19.         Imaging Results (Last 24 Hours)     ** No results found for the last 24  hours. **        I have reviewed the medications:  Scheduled Meds:  aspirin 325 mg Oral Daily   azithromycin 500 mg Oral Q24H   cefTRIAXone 1 g Intravenous Q24H   folic acid 1 mg Oral Daily   gabapentin 800 mg Oral Q8H   heparin (porcine) 5,000 Units Subcutaneous Q12H   lactobacillus acidophilus 1 capsule Oral Daily   melatonin 5 mg Oral Nightly   miconazole 200 mg Vaginal Nightly   nicotine 1 patch Transdermal Q24H   oseltamivir 30 mg Oral Q12H   pantoprazole 40 mg Oral BID AC   rosuvastatin 20 mg Oral Daily   sodium chloride 10 mL Intravenous Q12H   tiZANidine 4 mg Oral Q8H   traZODone 25 mg Oral Nightly   venlafaxine  mg Oral Daily     Continuous Infusions:  sodium chloride 125 mL/hr Last Rate: 125 mL/hr (03/13/20 5459)     PRN Meds:.•  albuterol  •  calcium gluconate IVPB **AND** calcium gluconate IVPB **AND** Calcium, Ionized  •  HYDROcodone-acetaminophen  •  influenza vaccine  •  magnesium sulfate **OR** magnesium sulfate in D5W 1g/100mL (PREMIX) **OR** magnesium sulfate  •  ondansetron  •  potassium & sodium phosphates  •  potassium chloride **OR** potassium chloride **OR** potassium chloride  •  sodium chloride  •  sodium chloride    Assessment/Plan   Assessment & Plan     Active Hospital Problems    Diagnosis  POA   • Dehydration [E86.0]  Yes   • Stage 3 chronic kidney disease (CMS/HCC) [N18.3]  Unknown   • Influenza A [J10.1]  Unknown   • 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        Brief Hospital Course to date:  Lexii Duckworth is a 59 y.o. female who presented not feeling well and hypotensive.    Not feeling good  -syncope  -Influenza A  -Droplet  -Hypotension  Sleep Apnea  -suspect untreated outside hospital  Influenza A  -Tamiflu  Dehydration  -Continue IV fluids  Chronic pain  -Asking to increase her  pain medicine today  -Asking to increase her gabapentin today  Chronic kidney disease  -Gabapentin was decreased on admission to protect kidneys  -Unclear baseline  Syncope  -Due to dehydration  Jittery/tremulous  -Stop standing nebs  Tobacco use disorder  -Nicotine patch    Increase gabapentin from 400 every 8 to 800 every 8 -watch kidney function  Increase Norco from 5 mg to 7.5 mg    DVT Prophylaxis: Heparin 5000 units subcu every 12 hours    Disposition: I expect the patient to be discharged back to the [facility] on Saturday morning    CODE STATUS:   Code Status and Medical Interventions:   Ordered at: 03/11/20 1642     Level Of Support Discussed With:    Patient     Code Status:    CPR     Medical Interventions (Level of Support Prior to Arrest):    Full         Electronically signed by Emanuel Moseley MD, 03/13/20, 17:59.

## 2020-03-14 LAB
ANION GAP SERPL CALCULATED.3IONS-SCNC: 8 MMOL/L (ref 5–15)
BUN BLD-MCNC: 11 MG/DL (ref 6–20)
BUN/CREAT SERPL: 11.2 (ref 7–25)
CALCIUM SPEC-SCNC: 8.8 MG/DL (ref 8.6–10.5)
CHLORIDE SERPL-SCNC: 112 MMOL/L (ref 98–107)
CO2 SERPL-SCNC: 24 MMOL/L (ref 22–29)
CREAT BLD-MCNC: 0.98 MG/DL (ref 0.57–1)
DEPRECATED RDW RBC AUTO: 53.2 FL (ref 37–54)
ERYTHROCYTE [DISTWIDTH] IN BLOOD BY AUTOMATED COUNT: 14.5 % (ref 12.3–15.4)
GFR SERPL CREATININE-BSD FRML MDRD: 58 ML/MIN/1.73
GLUCOSE BLD-MCNC: 74 MG/DL (ref 65–99)
HCT VFR BLD AUTO: 37.4 % (ref 34–46.6)
HGB BLD-MCNC: 11 G/DL (ref 12–15.9)
MCH RBC QN AUTO: 29.2 PG (ref 26.6–33)
MCHC RBC AUTO-ENTMCNC: 29.4 G/DL (ref 31.5–35.7)
MCV RBC AUTO: 99.2 FL (ref 79–97)
PLATELET # BLD AUTO: 118 10*3/MM3 (ref 140–450)
PMV BLD AUTO: 12.3 FL (ref 6–12)
POTASSIUM BLD-SCNC: 4.5 MMOL/L (ref 3.5–5.2)
PROCALCITONIN SERPL-MCNC: 0.08 NG/ML (ref 0.1–0.25)
RBC # BLD AUTO: 3.77 10*6/MM3 (ref 3.77–5.28)
SODIUM BLD-SCNC: 144 MMOL/L (ref 136–145)
WBC NRBC COR # BLD: 3.21 10*3/MM3 (ref 3.4–10.8)

## 2020-03-14 PROCEDURE — 84145 PROCALCITONIN (PCT): CPT | Performed by: HOSPITALIST

## 2020-03-14 PROCEDURE — 99225 PR SBSQ OBSERVATION CARE/DAY 25 MINUTES: CPT | Performed by: HOSPITALIST

## 2020-03-14 PROCEDURE — G0378 HOSPITAL OBSERVATION PER HR: HCPCS

## 2020-03-14 PROCEDURE — 80048 BASIC METABOLIC PNL TOTAL CA: CPT | Performed by: HOSPITALIST

## 2020-03-14 PROCEDURE — 25010000002 HEPARIN (PORCINE) PER 1000 UNITS: Performed by: NURSE PRACTITIONER

## 2020-03-14 PROCEDURE — 25010000002 CEFTRIAXONE PER 250 MG: Performed by: NURSE PRACTITIONER

## 2020-03-14 PROCEDURE — 85027 COMPLETE CBC AUTOMATED: CPT | Performed by: HOSPITALIST

## 2020-03-14 PROCEDURE — 96366 THER/PROPH/DIAG IV INF ADDON: CPT

## 2020-03-14 PROCEDURE — 96372 THER/PROPH/DIAG INJ SC/IM: CPT

## 2020-03-14 RX ADMIN — GABAPENTIN 800 MG: 400 CAPSULE ORAL at 13:37

## 2020-03-14 RX ADMIN — GABAPENTIN 800 MG: 400 CAPSULE ORAL at 07:20

## 2020-03-14 RX ADMIN — OSELTAMIVIR PHOSPHATE 30 MG: 30 CAPSULE ORAL at 08:57

## 2020-03-14 RX ADMIN — PANTOPRAZOLE SODIUM 40 MG: 40 TABLET, DELAYED RELEASE ORAL at 17:39

## 2020-03-14 RX ADMIN — SODIUM CHLORIDE, PRESERVATIVE FREE 10 ML: 5 INJECTION INTRAVENOUS at 08:56

## 2020-03-14 RX ADMIN — FOLIC ACID 1 MG: 1 TABLET ORAL at 08:57

## 2020-03-14 RX ADMIN — HEPARIN SODIUM 5000 UNITS: 5000 INJECTION, SOLUTION INTRAVENOUS; SUBCUTANEOUS at 21:11

## 2020-03-14 RX ADMIN — CEFTRIAXONE SODIUM 1 G: 1 INJECTION, POWDER, FOR SOLUTION INTRAMUSCULAR; INTRAVENOUS at 17:39

## 2020-03-14 RX ADMIN — ASPIRIN 325 MG ORAL TABLET 325 MG: 325 PILL ORAL at 08:56

## 2020-03-14 RX ADMIN — MICONAZOLE NITRATE 200 MG: 200 SUPPOSITORY VAGINAL at 21:11

## 2020-03-14 RX ADMIN — OSELTAMIVIR PHOSPHATE 30 MG: 30 CAPSULE ORAL at 21:10

## 2020-03-14 RX ADMIN — NICOTINE 1 PATCH: 21 PATCH, EXTENDED RELEASE TRANSDERMAL at 17:40

## 2020-03-14 RX ADMIN — HYDROCODONE BITARTRATE AND ACETAMINOPHEN 1 TABLET: 7.5; 325 TABLET ORAL at 09:07

## 2020-03-14 RX ADMIN — GABAPENTIN 800 MG: 400 CAPSULE ORAL at 21:11

## 2020-03-14 RX ADMIN — HEPARIN SODIUM 5000 UNITS: 5000 INJECTION, SOLUTION INTRAVENOUS; SUBCUTANEOUS at 08:56

## 2020-03-14 RX ADMIN — MELATONIN TAB 5 MG 5 MG: 5 TAB at 21:11

## 2020-03-14 RX ADMIN — TIZANIDINE 4 MG: 4 TABLET ORAL at 21:11

## 2020-03-14 RX ADMIN — AZITHROMYCIN DIHYDRATE 500 MG: 250 TABLET, FILM COATED ORAL at 08:56

## 2020-03-14 RX ADMIN — VENLAFAXINE HYDROCHLORIDE 150 MG: 75 CAPSULE, EXTENDED RELEASE ORAL at 08:56

## 2020-03-14 RX ADMIN — TIZANIDINE 4 MG: 4 TABLET ORAL at 13:38

## 2020-03-14 RX ADMIN — ROSUVASTATIN CALCIUM 20 MG: 20 TABLET, COATED ORAL at 08:56

## 2020-03-14 RX ADMIN — Medication 1 CAPSULE: at 08:56

## 2020-03-14 RX ADMIN — SODIUM CHLORIDE, PRESERVATIVE FREE 10 ML: 5 INJECTION INTRAVENOUS at 21:12

## 2020-03-14 RX ADMIN — TRAZODONE HYDROCHLORIDE 25 MG: 50 TABLET ORAL at 21:10

## 2020-03-14 RX ADMIN — TIZANIDINE 4 MG: 4 TABLET ORAL at 07:20

## 2020-03-14 RX ADMIN — HYDROCODONE BITARTRATE AND ACETAMINOPHEN 1 TABLET: 7.5; 325 TABLET ORAL at 17:43

## 2020-03-14 RX ADMIN — PANTOPRAZOLE SODIUM 40 MG: 40 TABLET, DELAYED RELEASE ORAL at 07:20

## 2020-03-14 NOTE — PLAN OF CARE
Problem: Patient Care Overview  Goal: Plan of Care Review  Outcome: Ongoing (interventions implemented as appropriate)  Flowsheets (Taken 3/14/2020 5515)  Plan of Care Reviewed With: patient  Note:   VSS, 2L O2 via NC. Patient rested well during shift. Chronic LBP well controlled with NORCO and positioning. Infrequent and non productive cough. No other complaints at this time.

## 2020-03-14 NOTE — PLAN OF CARE
Problem: Patient Care Overview  Goal: Plan of Care Review  Outcome: Ongoing (interventions implemented as appropriate)  Flowsheets (Taken 3/14/2020 1725)  Progress: no change  Plan of Care Reviewed With: patient  Outcome Summary: pt remains a/o, 2 L NC, NSR, VSS. iv fluids continued. frequent c/o back pain, norco given x 2, zanaflex given x 1. last BM 3/12. coughing w/no productive sputum. will continue to monitor.

## 2020-03-14 NOTE — PROGRESS NOTES
[facility] Medicine Services  PROGRESS NOTE    Patient Name: Lexii Duckworth  : 1960  MRN: [id number]    Date of Admission: 3/11/2020  Primary Care Physician: Fausto Valentin MD    Subjective   Subjective     CC:    Syncope    HPI: No daughter in room today.  Discussed discharge back to [facility].  Patient says she does not feel well enough to go today.  She is worried about her wheezing.      Review of Systems    Gen- No fevers, chills  CV- No chest pain, palpitations  Resp- No cough, dyspnea  GI- No N/V/D, abd pain        Objective   Objective     Vital Signs:   Temp:  [96.1 °F (35.6 °C)-97.7 °F (36.5 °C)] 96.9 °F (36.1 °C)  Heart Rate:  [63-81] 66  Resp:  [18-20] 18  BP: ()/(44-87) 126/80        Physical Exam:    Constitutional: No acute distress, awake, alert  HENT: NCAT, mucous membranes moist  Respiratory: Clear to auscultation bilaterally, respiratory effort normal   Cardiovascular: RRR, no murmurs, rubs, or gallops, palpable pedal pulses bilaterally  Gastrointestinal: Positive bowel sounds, soft, nontender, nondistended  Musculoskeletal: No bilateral ankle edema  Psychiatric: Appropriate affect, cooperative  Neurologic: Oriented x 3, strength symmetric in all extremities, Cranial Nerves grossly intact to confrontation, speech clear  Skin: No rashes    Results Reviewed:  Results from last 7 days   Lab Units 20  0506 20  0240 20  1201   WBC 10*3/mm3 3.21* 3.89 4.58   HEMOGLOBIN g/dL 11.0* 10.5* 11.9*   HEMATOCRIT % 37.4 36.2 38.9   PLATELETS 10*3/mm3 118* 114* 150   PROCALCITONIN ng/mL 0.08*  --  0.43*     Results from last 7 days   Lab Units 20  0506 20  0240 20  1201   SODIUM mmol/L 144 143 136   POTASSIUM mmol/L 4.5 4.3 4.0   CHLORIDE mmol/L 112* 111* 97*   CO2 mmol/L 24.0 23.0 23.0   BUN mg/dL 11 28* 35*   CREATININE mg/dL 0.98 1.32* 1.73*   GLUCOSE mg/dL 74 74 100*   CALCIUM mg/dL 8.8 7.5* 8.6   ALT (SGPT) U/L  --  37* 51*    AST (SGOT) U/L  --  47* 73*   TROPONIN T ng/mL  --   --  <0.010   PROBNP pg/mL  --   --  425.0     Estimated Creatinine Clearance: 67.1 mL/min (by C-G formula based on SCr of 0.98 mg/dL).    Microbiology Results Abnormal     Procedure Component Value - Date/Time    Blood Culture - Blood, Arm, Right [[id number]] Collected:  03/11/20 1202    Lab Status:  Preliminary result Specimen:  Blood from Arm, Right Updated:  03/14/20 1245     Blood Culture No growth at 3 days    Blood Culture - Blood, Arm, Right [[id number]] Collected:  03/11/20 1155    Lab Status:  Preliminary result Specimen:  Blood from Arm, Right Updated:  03/14/20 1245     Blood Culture No growth at 3 days    Urine Culture - Urine, Urine, Catheter [[id number]]  (Normal) Collected:  03/11/20 1150    Lab Status:  Final result Specimen:  Urine, Catheter Updated:  03/13/20 0403     Urine Culture No growth    Respiratory Panel, PCR - Swab, Nasopharynx [[id number]]  (Abnormal) Collected:  03/11/20 1240    Lab Status:  Final result Specimen:  Swab from Nasopharynx Updated:  03/11/20 1438     ADENOVIRUS, PCR Not Detected     Coronavirus 229E Not Detected     Coronavirus HKU1 Not Detected     Coronavirus NL63 Not Detected     Coronavirus OC43 Not Detected     Human Metapneumovirus Not Detected     Human Rhinovirus/Enterovirus Not Detected     Influenza B PCR Not Detected     Parainfluenza Virus 1 Not Detected     Parainfluenza Virus 2 Not Detected     Parainfluenza Virus 3 Not Detected     Parainfluenza Virus 4 Not Detected     Bordetella pertussis pcr Not Detected     Influenza A H1 2009 PCR Detected     Chlamydophila pneumoniae PCR Not Detected     Mycoplasma pneumo by PCR Not Detected     Influenza A H3 Not Detected     Influenza A H1 Not Detected     RSV, PCR Not Detected     Bordetella parapertussis PCR Not Detected    Narrative:       The coronavirus on the RVP is NOT COVID-19 and is NOT indicative of infection with COVID-19.           Imaging Results (Last 24  Hours)     ** No results found for the last 24 hours. **               I have reviewed the medications:  Scheduled Meds:  aspirin 325 mg Oral Daily   azithromycin 500 mg Oral Q24H   cefTRIAXone 1 g Intravenous Q24H   folic acid 1 mg Oral Daily   gabapentin 800 mg Oral Q8H   heparin (porcine) 5,000 Units Subcutaneous Q12H   lactobacillus acidophilus 1 capsule Oral Daily   melatonin 5 mg Oral Nightly   miconazole 200 mg Vaginal Nightly   nicotine 1 patch Transdermal Q24H   oseltamivir 30 mg Oral Q12H   pantoprazole 40 mg Oral BID AC   rosuvastatin 20 mg Oral Daily   sodium chloride 10 mL Intravenous Q12H   tiZANidine 4 mg Oral Q8H   traZODone 25 mg Oral Nightly   venlafaxine  mg Oral Daily     Continuous Infusions:  sodium chloride 50 mL/hr Last Rate: 50 mL/hr (03/14/20 1800)     PRN Meds:.•  albuterol  •  calcium gluconate IVPB **AND** calcium gluconate IVPB **AND** Calcium, Ionized  •  HYDROcodone-acetaminophen  •  influenza vaccine  •  magnesium sulfate **OR** magnesium sulfate in D5W 1g/100mL (PREMIX) **OR** magnesium sulfate  •  ondansetron  •  potassium & sodium phosphates  •  potassium chloride **OR** potassium chloride **OR** potassium chloride  •  sodium chloride  •  sodium chloride    Assessment/Plan   Assessment & Plan     Active Hospital Problems    Diagnosis  POA   • Dehydration [E86.0]  Yes   • Stage 3 chronic kidney disease (CMS/HCC) [N18.3]  Unknown   • Influenza A [J10.1]  Unknown   • 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        Brief Hospital Course to date:  Lexii Duckworth is a 59 y.o. female admitted after syncopal episode at [facility]    Syncope  -Volume depletion  -She has been dehydrated  Untreated sleep apnea  -Treat this while she is here to help her immune system  Influenza  A  -Tamiflu  Chronic pain  -Continue pain medication  Chronic kidney disease  -Unclear baseline  Dehydration  -IV fluids  Tremulousness  Tobacco use disorder    DVT Prophylaxis: Subcu heparin    Disposition: I expect the patient to be discharged to the [facility] on Sunday    CODE STATUS:   Code Status and Medical Interventions:   Ordered at: 03/11/20 1642     Level Of Support Discussed With:    Patient     Code Status:    CPR     Medical Interventions (Level of Support Prior to Arrest):    Full         Electronically signed by Emanuel Moseley MD, 03/14/20, 18:31.

## 2020-03-15 VITALS
OXYGEN SATURATION: 92 % | HEART RATE: 91 BPM | RESPIRATION RATE: 18 BRPM | TEMPERATURE: 97.1 F | SYSTOLIC BLOOD PRESSURE: 140 MMHG | DIASTOLIC BLOOD PRESSURE: 96 MMHG | WEIGHT: 169.9 LBS | HEIGHT: 66 IN | BODY MASS INDEX: 27.31 KG/M2

## 2020-03-15 PROCEDURE — G0378 HOSPITAL OBSERVATION PER HR: HCPCS

## 2020-03-15 PROCEDURE — 25010000002 HEPARIN (PORCINE) PER 1000 UNITS: Performed by: NURSE PRACTITIONER

## 2020-03-15 PROCEDURE — 99217 PR OBSERVATION CARE DISCHARGE MANAGEMENT: CPT | Performed by: HOSPITALIST

## 2020-03-15 PROCEDURE — 96372 THER/PROPH/DIAG INJ SC/IM: CPT

## 2020-03-15 RX ORDER — GABAPENTIN 600 MG/1
600 TABLET ORAL 3 TIMES DAILY
Qty: 90 TABLET | Refills: 0 | Status: SHIPPED | OUTPATIENT
Start: 2020-03-15 | End: 2020-04-14

## 2020-03-15 RX ORDER — HYDROCODONE BITARTRATE AND ACETAMINOPHEN 5; 325 MG/1; MG/1
1 TABLET ORAL EVERY 8 HOURS PRN
Qty: 9 TABLET | Refills: 0 | Status: SHIPPED | OUTPATIENT
Start: 2020-03-15

## 2020-03-15 RX ORDER — OSELTAMIVIR PHOSPHATE 30 MG/1
30 CAPSULE ORAL EVERY 12 HOURS SCHEDULED
Qty: 2 CAPSULE | Refills: 0 | Status: SHIPPED | OUTPATIENT
Start: 2020-03-15 | End: 2020-03-16

## 2020-03-15 RX ORDER — NICOTINE 21 MG/24HR
1 PATCH, TRANSDERMAL 24 HOURS TRANSDERMAL EVERY 24 HOURS
Qty: 30 PATCH | Refills: 0 | Status: SHIPPED | OUTPATIENT
Start: 2020-03-15 | End: 2020-04-14

## 2020-03-15 RX ADMIN — GABAPENTIN 800 MG: 400 CAPSULE ORAL at 13:12

## 2020-03-15 RX ADMIN — SODIUM CHLORIDE, PRESERVATIVE FREE 10 ML: 5 INJECTION INTRAVENOUS at 09:30

## 2020-03-15 RX ADMIN — VENLAFAXINE HYDROCHLORIDE 150 MG: 75 CAPSULE, EXTENDED RELEASE ORAL at 09:29

## 2020-03-15 RX ADMIN — ASPIRIN 325 MG ORAL TABLET 325 MG: 325 PILL ORAL at 09:29

## 2020-03-15 RX ADMIN — HYDROCODONE BITARTRATE AND ACETAMINOPHEN 1 TABLET: 7.5; 325 TABLET ORAL at 00:20

## 2020-03-15 RX ADMIN — OSELTAMIVIR PHOSPHATE 30 MG: 30 CAPSULE ORAL at 09:29

## 2020-03-15 RX ADMIN — PANTOPRAZOLE SODIUM 40 MG: 40 TABLET, DELAYED RELEASE ORAL at 09:28

## 2020-03-15 RX ADMIN — GABAPENTIN 800 MG: 400 CAPSULE ORAL at 09:28

## 2020-03-15 RX ADMIN — ROSUVASTATIN CALCIUM 20 MG: 20 TABLET, COATED ORAL at 09:29

## 2020-03-15 RX ADMIN — HYDROCODONE BITARTRATE AND ACETAMINOPHEN 1 TABLET: 7.5; 325 TABLET ORAL at 09:29

## 2020-03-15 RX ADMIN — HEPARIN SODIUM 5000 UNITS: 5000 INJECTION, SOLUTION INTRAVENOUS; SUBCUTANEOUS at 09:29

## 2020-03-15 RX ADMIN — AZITHROMYCIN DIHYDRATE 500 MG: 250 TABLET, FILM COATED ORAL at 09:29

## 2020-03-15 RX ADMIN — FOLIC ACID 1 MG: 1 TABLET ORAL at 09:29

## 2020-03-15 RX ADMIN — TIZANIDINE 4 MG: 4 TABLET ORAL at 09:29

## 2020-03-15 RX ADMIN — TIZANIDINE 4 MG: 4 TABLET ORAL at 13:12

## 2020-03-15 RX ADMIN — Medication 1 CAPSULE: at 09:29

## 2020-03-15 NOTE — PROGRESS NOTES
Case Management Discharge Note      Final Note: Patient's plan is to return to [facility] at discharge.  , Laila verified that she is able to return today.  Patient agreeable to [employer].  CM will schedule when patient ready for discharge.  Lea Bailey RN [phone]         Destination      No service has been selected for the patient.      Durable Medical Equipment      Service Provider Request Status Selected Services Address Phone Number Fax Number    [facility] Selected Durable Medical Equipment [address] [id number] [phone]      Dialysis/Infusion      No service has been selected for the patient.      Home Medical Care      No service has been selected for the patient.      Therapy      No service has been selected for the patient.      Community Resources      No service has been selected for the patient.        Transportation Services  Taxi: other([employer])    Final Discharge Disposition Code: 01 - home or self-care

## 2020-03-15 NOTE — DISCHARGE SUMMARY
[facility] Medicine Services  DISCHARGE SUMMARY    Patient Name: Lexii Duckworth  : 1960  MRN: [id number]    Date of Admission: 3/11/2020 11:27 AM  Date of Discharge:  3/15/2020 ()  Primary Care Physician: Fausto Valentin MD    Consults     No orders found from 2020 to 3/12/2020.        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      hypotension    Hospital Course:  Lexii Duckworth is a 59 y.o. female admitted with syncope and screened positive for influenza A.   She was hypotensive on admission and was feeling ill.  She lives at the [facility] and has sleep apnea which is likely untreated.  She was dehydrated and got volume by IV fluids.  She has been on chronic pain medication and does not like tramadol, asking for Norco instead.    She appears to not like her current living situation.      Discharge Follow Up Recommendations for outpatient labs/diagnostics:   treat sleep apnea    Day of Discharge     HPI:     Does not want to go back to the [facility].  No fevers today.  No family in room today.    Review of Systems     Gen- No fevers, chills  CV- No chest pain, palpitations  Resp- No cough, dyspnea  GI- No N/V/D, abd pain    Vital Signs:   Temp:  [96.9 °F (36.1 °C)-98 °F (36.7 °C)] 97.3 °F (36.3 °C)  Heart Rate:  [66-82] 79  Resp:  [18] 18  BP: (105-174)/(67-96) 166/96     Physical Exam:    Constitutional: No acute distress, chronically ill appearing  HENT: NCAT, s1 and s2  Respiratory: poor inspiratory effort, course breath  sounds  Cardiovascular: RRR, s1 and s2  Gastrointestinal: Positive bowel sounds, soft, nontender, obese abdomen  Musculoskeletal: No bilateral ankle edema  Psychiatric: flat affect, cooperative  Neurologic: Oriented x 3, strength symmetric in all extremities, Cranial Nerves grossly intact to confrontation, speech clear  Skin: dry, + skin tenting    Pertinent  and/or Most Recent Results     Results from last 7 days   Lab Units 03/14/20  0506 03/12/20  0240 03/11/20  1201   WBC 10*3/mm3 3.21* 3.89 4.58   HEMOGLOBIN g/dL 11.0* 10.5* 11.9*   HEMATOCRIT % 37.4 36.2 38.9   PLATELETS 10*3/mm3 118* 114* 150   SODIUM mmol/L 144 143 136   POTASSIUM mmol/L 4.5 4.3 4.0   CHLORIDE mmol/L 112* 111* 97*   CO2 mmol/L 24.0 23.0 23.0   BUN mg/dL 11 28* 35*   CREATININE mg/dL 0.98 1.32* 1.73*   GLUCOSE mg/dL 74 74 100*   CALCIUM mg/dL 8.8 7.5* 8.6     Results from last 7 days   Lab Units 03/12/20  0240 03/11/20  1201   BILIRUBIN mg/dL <0.2* 0.4   ALK PHOS U/L 69 86   ALT (SGPT) U/L 37* 51*   AST (SGOT) U/L 47* 73*           Invalid input(s): TG, LDLCALC, LDLREALC  Results from last 7 days   Lab Units 03/14/20  0506 03/12/20  0240 03/11/20  1202 03/11/20  1201   CORTISOL mcg/dL  --  7.42  --   --    PROBNP pg/mL  --   --   --  425.0   TROPONIN T ng/mL  --   --   --  <0.010   PROCALCITONIN ng/mL 0.08*  --   --  0.43*   LACTATE mmol/L  --   --  1.3  --        Brief Urine Lab Results  (Last result in the past 365 days)      Color   Clarity   Blood   Leuk Est   Nitrite   Protein   CREAT   Urine HCG        03/11/20 1150 Yellow Clear Negative Trace Negative 100 mg/dL (2+)               Microbiology Results Abnormal     Procedure Component Value - Date/Time    Blood Culture - Blood, Arm, Right [[id number]] Collected:  03/11/20 1202    Lab Status:  Preliminary result Specimen:  Blood from Arm, Right Updated:  03/14/20 1245     Blood Culture No growth at 3 days    Blood Culture - Blood, Arm, Right [[id number]] Collected:  03/11/20 1155    Lab  Status:  Preliminary result Specimen:  Blood from Arm, Right Updated:  03/14/20 1245     Blood Culture No growth at 3 days    Urine Culture - Urine, Urine, Catheter [[id number]]  (Normal) Collected:  03/11/20 1150    Lab Status:  Final result Specimen:  Urine, Catheter Updated:  03/13/20 0403     Urine Culture No growth    Respiratory Panel, PCR - Swab, Nasopharynx [[id number]]  (Abnormal) Collected:  03/11/20 1240    Lab Status:  Final result Specimen:  Swab from Nasopharynx Updated:  03/11/20 1438     ADENOVIRUS, PCR Not Detected     Coronavirus 229E Not Detected     Coronavirus HKU1 Not Detected     Coronavirus NL63 Not Detected     Coronavirus OC43 Not Detected     Human Metapneumovirus Not Detected     Human Rhinovirus/Enterovirus Not Detected     Influenza B PCR Not Detected     Parainfluenza Virus 1 Not Detected     Parainfluenza Virus 2 Not Detected     Parainfluenza Virus 3 Not Detected     Parainfluenza Virus 4 Not Detected     Bordetella pertussis pcr Not Detected     Influenza A H1 2009 PCR Detected     Chlamydophila pneumoniae PCR Not Detected     Mycoplasma pneumo by PCR Not Detected     Influenza A H3 Not Detected     Influenza A H1 Not Detected     RSV, PCR Not Detected     Bordetella parapertussis PCR Not Detected    Narrative:       The coronavirus on the RVP is NOT COVID-19 and is NOT indicative of infection with COVID-19.           Imaging Results (All)     Procedure Component Value Units Date/Time    CT Head Without Contrast [[id number]] Collected:  03/11/20 1348     Updated:  03/11/20 1735    Narrative:       EXAMINATION: CT HEAD WO CONTRAST-03/11/2020:      INDICATION: Syncope; gen weakness; hypotension.      TECHNIQUE: CT head without intravenous contrast.     The radiation dose reduction device was turned on for each scan per the  ALARA (As Low as Reasonably Achievable) protocol.     COMPARISON: NONE.     FINDINGS:  The midline structures are symmetric without evidence of  mass, mass effect or  midline shift. Ventricles and sulci within normal  limits. No intra-axial hemorrhage or extra-axial fluid collection.  Globes and orbits are unremarkable. The visualized paranasal sinuses and  mastoid air cells are grossly clear and well pneumatized. Calvarium  intact.       Impression:       No acute intracranial abnormality.     D:  03/11/2020  E:  03/11/2020           This report was finalized on 3/11/2020 5:32 PM by Dr. Robb Cisneros.       CT Chest Without Contrast [[id number]] Collected:  03/11/20 1357     Updated:  03/11/20 1735    Narrative:       EXAMINATION: CT ABDOMEN/PELVIS WO CONTRAST, CT CHEST WO CONTRAST -  03/11/2020     INDICATION: Abdominal pain. Syncope.     TECHNIQUE: CT chest, abdomen and pelvis without intravenous contrast.     The radiation dose reduction device was turned on for each scan per the  ALARA (As Low as Reasonably Achievable) protocol.     COMPARISON: None.     FINDINGS:      CHEST: Thyroid is homogeneous in attenuation. No bulky mediastinal  adenopathy. Central airways are patent. Esophagus in normal course and  caliber. Atherosclerotic nonaneurysmal thoracic ureter. Cardiac size  within normal limits and without pericardial effusion. Extended lung  windows demonstrate biapical pleural-parenchymal scarring with central  bronchiectasis and midlung scarring however no dominant nodule or mass.  Calcified granuloma right lung base along with area of adjacent scarring  and small noncalcified 4 mm likely inflammatory nodule without  consolidation or effusion. Degenerative changes of the spine without  aggressive osseous or soft tissue body wall findings demonstrate  scoliotic curvature of the spine.     ABDOMEN AND PELVIS: Liver is without focal lesion. Gallbladder  surgically absent. No biliary dilatation. Pancreas and spleen  unremarkable with splenule adjacent the anterolateral margin of the  spleen. Adrenals demonstrate bilateral adrenal nodules 1 cm right and  1.3 cm left with  attenuation values consistent of lipid rich adenomas.  Kidneys without hydronephrosis or hydroureter. No bulky retroperitoneal  adenopathy. Atherosclerotic nonaneurysmal abdominal aorta. GI tract  evaluation without focal thickening or disproportionate dilatation of  bowel. Appendix visualized in retrocecal location and without associated  inflammation. No free fluid or intra-abdominal free air. Pelvic viscera  grossly unremarkable without bulky pelvic adenopathy or free fluid.  Degenerative changes of the spine without aggressive osseous or soft  tissue body wall lesions including visualized portions of the pelvis. No  soft tissue body wall findings of concern.       Impression:       No acute intrathoracic, intra-abdominal or intrapelvic  findings with minimal chronic changes and postinfectious/post  inflammatory findings right lower lung. No mechanical obstructive  process or focal inflammatory findings of bowel.     DICTATED:   03/11/2020  EDITED/ls :   03/11/2020         This report was finalized on 3/11/2020 5:32 PM by Dr. Robb Cisneros.       CT Abdomen Pelvis Without Contrast [[id number]] Collected:  03/11/20 1357     Updated:  03/11/20 1735    Narrative:       EXAMINATION: CT ABDOMEN/PELVIS WO CONTRAST, CT CHEST WO CONTRAST -  03/11/2020     INDICATION: Abdominal pain. Syncope.     TECHNIQUE: CT chest, abdomen and pelvis without intravenous contrast.     The radiation dose reduction device was turned on for each scan per the  ALARA (As Low as Reasonably Achievable) protocol.     COMPARISON: None.     FINDINGS:      CHEST: Thyroid is homogeneous in attenuation. No bulky mediastinal  adenopathy. Central airways are patent. Esophagus in normal course and  caliber. Atherosclerotic nonaneurysmal thoracic ureter. Cardiac size  within normal limits and without pericardial effusion. Extended lung  windows demonstrate biapical pleural-parenchymal scarring with central  bronchiectasis and midlung scarring however no  dominant nodule or mass.  Calcified granuloma right lung base along with area of adjacent scarring  and small noncalcified 4 mm likely inflammatory nodule without  consolidation or effusion. Degenerative changes of the spine without  aggressive osseous or soft tissue body wall findings demonstrate  scoliotic curvature of the spine.     ABDOMEN AND PELVIS: Liver is without focal lesion. Gallbladder  surgically absent. No biliary dilatation. Pancreas and spleen  unremarkable with splenule adjacent the anterolateral margin of the  spleen. Adrenals demonstrate bilateral adrenal nodules 1 cm right and  1.3 cm left with attenuation values consistent of lipid rich adenomas.  Kidneys without hydronephrosis or hydroureter. No bulky retroperitoneal  adenopathy. Atherosclerotic nonaneurysmal abdominal aorta. GI tract  evaluation without focal thickening or disproportionate dilatation of  bowel. Appendix visualized in retrocecal location and without associated  inflammation. No free fluid or intra-abdominal free air. Pelvic viscera  grossly unremarkable without bulky pelvic adenopathy or free fluid.  Degenerative changes of the spine without aggressive osseous or soft  tissue body wall lesions including visualized portions of the pelvis. No  soft tissue body wall findings of concern.       Impression:       No acute intrathoracic, intra-abdominal or intrapelvic  findings with minimal chronic changes and postinfectious/post  inflammatory findings right lower lung. No mechanical obstructive  process or focal inflammatory findings of bowel.     DICTATED:   03/11/2020  EDITED/ls :   03/11/2020         This report was finalized on 3/11/2020 5:32 PM by Dr. Robb Cisneros.            Order Current Status    Blood Culture - Blood, Arm, Right Preliminary result    Blood Culture - Blood, Arm, Right Preliminary result        Discharge Details        Discharge Medications      New Medications      Instructions Start Date   nicotine 21  MG/24HR patch  Commonly known as:  NICODERM CQ   1 patch, Transdermal, Every 24 Hours      oseltamivir 30 MG capsule  Commonly known as:  TAMIFLU   30 mg, Oral, Every 12 Hours Scheduled         Changes to Medications      Instructions Start Date   HYDROcodone-acetaminophen 5-325 MG per tablet  Commonly known as:  NORCO  What changed:    · when to take this  · reasons to take this   1 tablet, Oral, Every 8 Hours PRN         Continue These Medications      Instructions Start Date   albuterol sulfate  (90 Base) MCG/ACT inhaler  Commonly known as:  PROVENTIL HFA;VENTOLIN HFA;PROAIR HFA   2 puffs, Inhalation, Every 4 Hours PRN      amitriptyline 10 MG tablet  Commonly known as:  ELAVIL   10 mg, Oral, Nightly      aspirin 325 MG tablet   1 tablet, Oral, Daily      folic acid 1 MG tablet  Commonly known as:  FOLVITE   1 mg, Oral, Daily      gabapentin 600 MG tablet  Commonly known as:  NEURONTIN   600 mg, Oral, 3 Times Daily      pantoprazole 40 MG EC tablet  Commonly known as:  PROTONIX   40 mg, Oral, 2 Times Daily Before Meals      rosuvastatin 20 MG tablet  Commonly known as:  CRESTOR   20 mg, Oral, Daily      venlafaxine  MG 24 hr capsule  Commonly known as:  EFFEXOR-XR   150 mg, Oral, Daily      vitamin D3 125 MCG (5000 UT) capsule capsule   5,000 Units, Oral, Daily         Stop These Medications    promethazine 25 MG tablet  Commonly known as:  PHENERGAN     tiZANidine 4 MG tablet  Commonly known as:  ZANAFLEX     traMADol 50 MG tablet  Commonly known as:  ULTRAM     traZODone 50 MG tablet  Commonly known as:  DESYREL          No Known Allergies    Discharge Disposition:  Home or Self Care    Diet:  Hospital:  Diet Order   Procedures   • Diet Regular     Activity:   As tolerated    Restrictions or Other Recommendations:    Consider using a sleep machine with positive pressure for sleep apnea       CODE STATUS:    Code Status and Medical Interventions:   Ordered at: 03/11/20 1642     Level Of Support  Discussed With:    Patient     Code Status:    CPR     Medical Interventions (Level of Support Prior to Arrest):    Full     No future appointments.    Discharge today back to the [facility] - Case Mgmt working on transportation.    Time Spent on Discharge:    39   minutes    Electronically signed by Emanuel Moseley MD, 03/15/20, 10:58 AM.

## 2020-03-16 ENCOUNTER — READMISSION MANAGEMENT (OUTPATIENT)
Dept: CALL CENTER | Facility: HOSPITAL | Age: 60
End: 2020-03-16

## 2020-03-16 LAB
BACTERIA SPEC AEROBE CULT: NORMAL
BACTERIA SPEC AEROBE CULT: NORMAL

## 2020-03-16 NOTE — OUTREACH NOTE
Prep Survey      Responses   List of hospitals in [place] facility patient discharged from?  [facility]   Is LACE score < 7 ?  No   Eligibility  Not Eligible   What are the reasons patient is not eligible?  Other [returning to [facility]]   Does the patient have one of the following disease processes/diagnoses(primary or secondary)?  Other   Prep survey completed?  Yes          Chasity Mandujano RN

## 2020-05-18 RX ORDER — TRAZODONE HYDROCHLORIDE 50 MG/1
TABLET ORAL
Qty: 30 TABLET | Refills: 2 | OUTPATIENT
Start: 2020-05-18

## 2020-05-26 RX ORDER — TRAZODONE HYDROCHLORIDE 50 MG/1
TABLET ORAL
Qty: 30 TABLET | Refills: 2 | OUTPATIENT
Start: 2020-05-26

## 2023-10-10 ENCOUNTER — OFFICE VISIT (OUTPATIENT)
Dept: CARDIOLOGY | Facility: CLINIC | Age: 63
End: 2023-10-10
Payer: COMMERCIAL

## 2023-10-10 VITALS
OXYGEN SATURATION: 96 % | SYSTOLIC BLOOD PRESSURE: 98 MMHG | HEIGHT: 66 IN | WEIGHT: 183 LBS | BODY MASS INDEX: 29.41 KG/M2 | DIASTOLIC BLOOD PRESSURE: 62 MMHG | HEART RATE: 102 BPM

## 2023-10-10 DIAGNOSIS — J44.89 CHRONIC BRONCHITIS WITH COPD (CHRONIC OBSTRUCTIVE PULMONARY DISEASE): ICD-10-CM

## 2023-10-10 DIAGNOSIS — G47.00 INSOMNIA, UNSPECIFIED TYPE: ICD-10-CM

## 2023-10-10 DIAGNOSIS — G47.10 HYPERSOMNIA: Primary | ICD-10-CM

## 2023-10-10 RX ORDER — AMITRIPTYLINE HYDROCHLORIDE 25 MG/1
TABLET, FILM COATED ORAL
COMMUNITY
Start: 2023-10-09

## 2023-10-10 RX ORDER — CLOPIDOGREL BISULFATE 75 MG/1
1 TABLET ORAL DAILY
COMMUNITY
Start: 2023-09-13 | End: 2023-10-10 | Stop reason: ALTCHOICE

## 2023-10-10 RX ORDER — ZOLPIDEM TARTRATE 5 MG/1
1 TABLET ORAL DAILY
COMMUNITY
Start: 2023-09-22

## 2023-10-10 RX ORDER — TIZANIDINE 4 MG/1
TABLET ORAL
COMMUNITY
Start: 2023-10-09

## 2023-10-10 RX ORDER — ROSUVASTATIN CALCIUM 40 MG/1
TABLET, COATED ORAL
COMMUNITY
Start: 2023-10-09

## 2023-10-10 RX ORDER — FLUTICASONE PROPIONATE AND SALMETEROL 50; 250 UG/1; UG/1
POWDER RESPIRATORY (INHALATION)
COMMUNITY
Start: 2023-10-09

## 2023-10-10 RX ORDER — GABAPENTIN 800 MG/1
1 TABLET ORAL 3 TIMES DAILY
COMMUNITY
Start: 2023-09-20

## 2023-10-10 RX ORDER — SUCRALFATE 1 G/1
TABLET ORAL
COMMUNITY
Start: 2023-10-03

## 2023-10-10 RX ORDER — HYDROCODONE BITARTRATE AND ACETAMINOPHEN 10; 325 MG/1; MG/1
1 TABLET ORAL 3 TIMES DAILY PRN
COMMUNITY
Start: 2023-07-26 | End: 2023-10-10 | Stop reason: ALTCHOICE

## 2023-10-10 NOTE — PROGRESS NOTES
New Sleep Consult     Date:   10/10/2023  Name: Lexii Duckworth  :   1960  PCP: Fausto Valentin MD    Chief Complaint   Patient presents with    [facility]       Subjective     History of Present Illness  Lexii Duckworth is a 63 y.o. female who presents today for new patient referral from family physician Dr. Uri Espinal for further evaluation of excessive daytime sleepiness, witnessed apneas, occasionally gasping for air and snoring.    She is accompanied by her very helpful daughter Windy who reports that her mom goes to bed about 1 AM and sleeps until 10 AM.  She reports an average of 6 hours of sleep per night.  Frequent awakenings 3 or 4 times.  And that it takes at least an hour to fall asleep.    She has a hard time going to sleep.  She has a hard time staying asleep.      Coexisting conditions tobacco use, hyperlipidemia, CAD, CKD, chronic pain syndrome, insomnia on Ambien    Further details are as follows:    Neck Measurement: 16.5 inches    [place] Scale is (out of 24): Total score: 5     Estimated average amount of sleep per night: 6  Number of times she wakes up at night: 3  Difficulty falling back asleep: yes  It usually takes 60 minutes to go to sleep.  She feels sleepy upon waking up: yes  Rotating or night shift work: no    Drowsiness/Sleepiness:  She exhibits the following:  excessive daytime sleepiness  excessive daytime fatigue  falls asleep watching TV  falls asleep during times of the day when she is quiet    Snoring/Breathing:  She exhibits the following:  loud snoring, quits breathing at night, awakens gasping for breath, and sore throat when waking up in the morning    Head Injury:  She exhibits the following:  No    Reflux:  She describes the following:  wakes up at night with a sour taste or burning sensation in chest  takes medication for reflux  eats 2 meals daily     Narcolepsy:  She exhibits the following:  none    RLS/PLMs:  She describes the following:  moves or  jerks during sleep    Insomnia:  She describes the following:  problems initiating sleep at night  frequent awakenings  bothered by pain at night  restless sleep    Parasomnia:  She exhibits the following:  sleep talks    Weight:       10/10/23  1040   Weight: 83 kg (183 lb)      Weight change in the last year:  gain: 10 lbs    The patient's relevant past medical, surgical, family, and social history reviewed and updated in Epic as appropriate.    Past Medical History:   Diagnosis Date    Anxiety     Back pain     CAD (coronary artery disease)     Chronic kidney disease     Chronic pain     COPD (chronic obstructive pulmonary disease)     Depression     Fibromyalgia     Hyperlipidemia     Hypertension     Migraine headache     Neck pain     Osteoarthritis     Stroke     left hemispheric (cortical) stroke     Past Surgical History:   Procedure Laterality Date    CAROTID ENDARTERECTOMY Left     HAND SURGERY      TUBAL ABDOMINAL LIGATION       OB History    No obstetric history on file.       No Known Allergies  Prior to Admission medications    Medication Sig Start Date End Date Taking? Authorizing Provider   Advair Diskus 250-50 MCG/ACT DISKUS  10/9/23  Yes Maryjo Friedman MD   albuterol sulfate  (90 Base) MCG/ACT inhaler Inhale 2 puffs Every 4 (Four) Hours As Needed for Wheezing.   Yes Maryjo Friedman MD   amitriptyline (ELAVIL) 25 MG tablet  10/9/23  Yes Maryjo Friedman MD   aspirin 325 MG tablet Take 1 tablet by mouth Daily. 3/18/13  Yes Con Lang MD   Cholecalciferol (VITAMIN D3) 125 MCG (5000 UT) capsule capsule Take 1 capsule by mouth Daily.   Yes Maryjo Friedman MD   gabapentin (NEURONTIN) 800 MG tablet Take 1 tablet by mouth 3 times a day. 9/20/23  Yes Maryjo Friedman MD   pantoprazole (PROTONIX) 40 MG EC tablet Take 1 tablet by mouth 2 (Two) Times a Day Before Meals.   Yes Maryjo Friedman MD   rosuvastatin (CRESTOR) 40 MG tablet   "10/9/23  Yes Maryjo Friedman MD   sucralfate (CARAFATE) 1 g tablet TAKE 1 TABLET BY MOUTH 4 TIMES A DAY AS DIRECTED 10/3/23  Yes Maryjo Friedman MD   tiZANidine (ZANAFLEX) 4 MG tablet  10/9/23  Yes Maryjo Friedman MD   zolpidem (AMBIEN) 5 MG tablet Take 1 tablet by mouth Daily. 9/22/23  Yes Maryjo Friedman MD   clopidogrel (PLAVIX) 75 MG tablet Take 1 tablet by mouth Daily. 9/13/23 10/10/23 Yes Maryjo Friedman MD   HYDROcodone-acetaminophen (NORCO)  MG per tablet Take 1 tablet by mouth 3 (Three) Times a Day As Needed. 7/26/23 10/10/23 Yes Maryjo Friedman MD   amitriptyline (ELAVIL) 10 MG tablet Take 10 mg by mouth Every Night.  10/10/23  Maryjo Friedman MD   folic acid (FOLVITE) 1 MG tablet Take 1 mg by mouth Daily.  10/10/23  Maryjo Friedman MD   HYDROcodone-acetaminophen (NORCO) 5-325 MG per tablet Take 1 tablet by mouth Every 8 (Eight) Hours As Needed for Severe Pain  for up to 9 doses. 3/15/20 10/10/23  Emanuel Moseley MD   rosuvastatin (CRESTOR) 20 MG tablet Take 20 mg by mouth Daily.  10/10/23  Maryjo Friedman MD   venlafaxine XR (EFFEXOR-XR) 150 MG 24 hr capsule Take 150 mg by mouth Daily.  10/10/23  Maryjo Friedman MD     Family History   Problem Relation Age of Onset    Cancer Father     Other Other         epilepy, hypertension, stroke syndrome    Heart disease Sister     Depression Sister     Mental illness Sister     COPD Sister     Heart disease Brother     Seizures Daughter        Objective     Vital Signs:  BP 98/62   Pulse 102   Ht 167.6 cm (66\")   Wt 83 kg (183 lb)   SpO2 96%   BMI 29.54 kg/mý     BMI is >= 25 and <30. (Overweight) The following options were offered after discussion;: referral to primary care        Physical Exam  Constitutional:       Appearance: Normal appearance. She is well-developed.   HENT:      Head: Normocephalic and atraumatic.      Nose: Nose normal.      Mouth/Throat:      Mouth: Mucous " membranes are moist.   Eyes:      General: No scleral icterus.     Pupils: Pupils are equal, round, and reactive to light.   Neck:      Vascular: No carotid bruit.   Cardiovascular:      Rate and Rhythm: Normal rate and regular rhythm.      Pulses: Normal pulses.           Radial pulses are 2+ on the right side and 2+ on the left side.        Dorsalis pedis pulses are 2+ on the right side and 2+ on the left side.        Posterior tibial pulses are 2+ on the right side and 2+ on the left side.      Heart sounds: Normal heart sounds. No murmur heard.  Pulmonary:      Effort: Pulmonary effort is normal.      Breath sounds: Normal breath sounds. No wheezing or rhonchi.   Abdominal:      General: Bowel sounds are normal.   Musculoskeletal:      Right lower leg: No edema.      Left lower leg: No edema.   Skin:     General: Skin is warm and dry.      Capillary Refill: Capillary refill takes less than 2 seconds.      Coloration: Skin is not cyanotic.      Nails: There is no clubbing.   Neurological:      Mental Status: She is alert and oriented to person, place, and time.      Motor: No weakness.      Gait: Gait normal.   Psychiatric:         Mood and Affect: Mood normal.         Behavior: Behavior normal. Behavior is cooperative.         Thought Content: Thought content normal.         Cognition and Memory: Memory normal.         The following data was reviewed by: UNRULY Menendez on 10/10/2023:    Office note from family physician Dr. Uri Espinal 8/18/2023          Assessment and Plan     Lexii Duckworth is a 63 y.o. female who presents for further evaluation of excessive daytime sleepiness and fatigue, nonrestorative sleep, and concerns for sleep disordered breathing and obstructive sleep apnea.  We will obtain testing for further evaluation.  The patient will return for follow-up and recommendations after test.  I have discussed weight loss as it pertains to obstructive sleep apnea.    Diagnoses and all  orders for this visit:    1. Hypersomnia (Primary)  Assessment & Plan:  She reports that she has excessive daytime sleepiness.  Also reports snoring, occasionally gasping for air, occasional witnessed apneas.    She has coexisting conditions of COPD, CAD, chronic kidney and chronic pain plus insomnia on Ambien.    With her coexisting conditions I have requested an in lab sleep study for further evaluation and treatment.    Orders:  -     Polysomnography; Future    2. Insomnia, unspecified type  Assessment & Plan:  She has difficulty falling asleep.  She has difficulty staying asleep.  She has frequent awakenings during the night.  She reports it takes an hour to fall asleep.    Then the next day she does not feel rested and has excessive daytime sleepiness.    She reports that her family physician has started her on Ambien and this has been helpful.          3. Chronic bronchitis with COPD (chronic obstructive pulmonary disease)  Assessment & Plan:  Patient reports that her lung disease has been described as a chronic bronchitis COPD.    She reports that she continues to use tobacco and at today's visit she reports she is unwilling to quit.    This is a coexisting condition that may contribute to her sleep breathing disorder.    Plan in lab study for further evaluation and treatment.              I discussed the consequences of uncontrolled sleep apnea including hypertension, heart disease, diabetes, stroke, and dementia. I further discussed sleep apnea therapeutic options including CPAP, Weight loss, Oral dental appliance, and surgery.             Follow Up  Return in about 2 months (around 12/10/2023) for Follow-Up after  sleep study.  Patient was given instructions and counseling regarding her condition or for health maintenance advice. Please see specific information pulled into the AVS if appropriate.

## 2023-10-10 NOTE — ASSESSMENT & PLAN NOTE
She reports that she has excessive daytime sleepiness.  Also reports snoring, occasionally gasping for air, occasional witnessed apneas.    She has coexisting conditions of COPD, CAD, chronic kidney and chronic pain plus insomnia on Ambien.    With her coexisting conditions I have requested an in lab sleep study for further evaluation and treatment.

## 2023-10-10 NOTE — ASSESSMENT & PLAN NOTE
She has difficulty falling asleep.  She has difficulty staying asleep.  She has frequent awakenings during the night.  She reports it takes an hour to fall asleep.    Then the next day she does not feel rested and has excessive daytime sleepiness.    She reports that her family physician has started her on Ambien and this has been helpful.

## 2023-10-10 NOTE — ASSESSMENT & PLAN NOTE
Patient reports that her lung disease has been described as a chronic bronchitis COPD.    She reports that she continues to use tobacco and at today's visit she reports she is unwilling to quit.    This is a coexisting condition that may contribute to her sleep breathing disorder.    Plan in lab study for further evaluation and treatment.

## 2023-11-08 ENCOUNTER — TELEPHONE (OUTPATIENT)
Dept: CARDIOLOGY | Facility: CLINIC | Age: 63
End: 2023-11-08
Payer: COMMERCIAL

## 2023-11-08 DIAGNOSIS — G47.33 OBSTRUCTIVE SLEEP APNEA SYNDROME: Primary | ICD-10-CM

## 2023-11-08 DIAGNOSIS — G47.10 HYPERSOMNIA: ICD-10-CM

## 2023-11-08 DIAGNOSIS — G47.00 INSOMNIA, UNSPECIFIED TYPE: ICD-10-CM

## 2023-11-08 DIAGNOSIS — J44.89 CHRONIC BRONCHITIS WITH COPD (CHRONIC OBSTRUCTIVE PULMONARY DISEASE): ICD-10-CM

## 2023-11-08 DIAGNOSIS — G47.36 SLEEP-RELATED HYPOVENTILATION DUE TO MEDICAL CONDITION: ICD-10-CM

## 2023-11-08 NOTE — TELEPHONE ENCOUNTER
I will put in the order for titration study at [facility].  I will also notify Dorcas Esparza at the sleep center that patient is willing to come Saturday and will be arranging her own transportation.  Please update the patient with risk of untreated sleep apnea and benefits of her having a titration study.

## 2023-11-08 NOTE — TELEPHONE ENCOUNTER
Called pt and pt's daughter,April, back with titration being scheduled for this Saturday, November 10,2023 @ [facility].  They have arranged transportation.  Sleep hygiene is reviewed.  Risks of not treating KIERRA reviewed (driving,medical including heart attack/stroke/a fib/and dementia, sleep death, alcohol, sedating agents and surgical risks)  Advised if they do not hear from [facility] by Friday, to alert our staff as order for titration has been sent.  They verbalize understanding.

## 2023-11-08 NOTE — TELEPHONE ENCOUNTER
Caller: April Duckworth    Relationship: Emergency Contact    Best call back number: [phone]    What is the best time to reach you: ANY    What was the call regarding: PLEASE HAVE REBECCA CALL THE ABOVE FOR THE UPCOMING TESTING    Is it okay if the provider responds through MyChart: NO

## 2023-11-08 NOTE — TELEPHONE ENCOUNTER
----- Message from UNRULY Menendez sent at 11/8/2023 11:02 AM EST -----  Please call the patient and her daughter Black and let them know that her sleep study results are in and they are very severe.  Her sleep apnea number was in the 40s.  She also had a very long respiratory pause of 53 seconds.  We measured her end-tidal CO2 which is the bad gas that we want to breathe out and her study indicates she may not be getting all that gas out all the time.  This is normally related to an underlying lung disease such as chronic bronchitis or COPD.  So she is a bit more complicated than the average patient.  My advice is for her to go back to the sleep lab another night.  Not for diagnosis because we have that clearly diagnosed severe sleep apnea but for treatment.  CPAP is the normal treatment for sleep apnea but some patients with lung disease and elevated end-tidal CO2 will do better auto BiPAP.  We have openings in the sleep lab Saturday night and Wednesday night.  I would like her to come on back and do a treatment study that night.  If she is in agreement and available please let me know thank you

## 2023-11-08 NOTE — TELEPHONE ENCOUNTER
Called pt back and related that [facility] would be calling her with appointment time for this Saturday, 11/11/2023.  She verbalizes understanding.

## 2023-11-08 NOTE — TELEPHONE ENCOUNTER
Called pt with results of recent sleep study.  She put her daughter,April, on speaker phone and was able to discuss findings with both at the same time.  Discussed diagnosis of severe KIERRA at great length.  Further discussed very long respiratory pause of 53 seconds and blood gas results per UNRULY Chris.  Further discussed that UNRULY Chris wanted pt to proceed with a titration study this Saturday due to her severity and specific needs of her severe KIERRA; and subsequent PAP therapy.  Both she and her daughter are agreeable.  She will arrange transportation.  Will notify UNRULY Chris and call pt back.  Both pt and her daughter verbalize understanding.

## 2023-11-16 ENCOUNTER — TELEPHONE (OUTPATIENT)
Dept: CARDIOLOGY | Facility: CLINIC | Age: 63
End: 2023-11-16
Payer: COMMERCIAL

## 2023-11-16 DIAGNOSIS — G47.33 OBSTRUCTIVE SLEEP APNEA SYNDROME: Primary | ICD-10-CM

## 2023-11-16 NOTE — TELEPHONE ENCOUNTER
Received a call from Dorcas [facility].  Pt was unable to complete a full night titration prior to initiation of PAP therapy as per UNRULY Chris's prior note.  Please advise.

## 2023-11-16 NOTE — TELEPHONE ENCOUNTER
I have ordered CPAP/FFM/Suppies at [facility] that has a [place] office and will do home set up for her if she needs this. I contacted Sleep Lab and they will reschedule Titration.

## 2023-11-16 NOTE — TELEPHONE ENCOUNTER
Call patient and let her know that sleep lab let me know that she did not tolerate the PAP mask so, they did not get to do the study. She needs PAP therapy with a mask. So, I will go on and order her a CPAP to start using at home and I do want her to go on back to the sleep lab and do the Titration. The Sleep lab said the next opening is the week after Thanksgiving. Sleep Lab will call her and reschedule. If they do not call next week then call us back. Please help identify why she could not sleep at the lab and help her overcome these barriers if possible. ( Example: fear of unknown, does not sleep well in unusual location, pain- if she has own meds then she may take, if she has sleep aid normally takes, then ok to take, no naps at home the night of study...) She has scheduled follow up with me, so lets plan to keep that appointment and we will see how she has done of CPAP at home and how she done on the repeat Titration. What CPAP company does she want to use? Mask style- is she a mouth breather.

## 2023-11-16 NOTE — TELEPHONE ENCOUNTER
"Called pt back in regards to not being able to complete titration study prior to PAP therapy.  She is agreeable to setup; does not have preference for a DME and relates she is a mouth breather.  Further discussed that [facility] will be calling her to arrange a full night titration which she is agreeable to try again.  She felt like it was \"too much\" for her.  Advised if she does not hear from a Sleep Lab or DME company within the week, to alert our staff.  She verbalizes understanding.  "

## 2023-11-17 ENCOUNTER — TELEPHONE (OUTPATIENT)
Dept: CARDIOLOGY | Facility: CLINIC | Age: 63
End: 2023-11-17
Payer: COMMERCIAL

## 2023-11-17 NOTE — TELEPHONE ENCOUNTER
Caller: Lexii Duckworth    Relationship: Self    Best call back number: [phone]    What is the best time to reach you: ANYTIME    What was the call regarding: PATIENT WOULD LIKE TO LET THE NURSE KNOW TO SCHEDULE HER SLEEP STUDY AFTER THE 30TH SO SHE HAS CAB MONEY TO GET THERE.

## 2023-11-17 NOTE — TELEPHONE ENCOUNTER
Called patient and gave her number to speak with Dorcas mcgraw at [facility] about her concerns of the time for her study. Thanks.

## 2024-02-08 DIAGNOSIS — J44.89 CHRONIC BRONCHITIS WITH COPD (CHRONIC OBSTRUCTIVE PULMONARY DISEASE): ICD-10-CM

## 2024-02-08 DIAGNOSIS — G47.36 SLEEP-RELATED HYPOVENTILATION DUE TO MEDICAL CONDITION: ICD-10-CM

## 2024-02-08 DIAGNOSIS — G47.33 OBSTRUCTIVE SLEEP APNEA SYNDROME: ICD-10-CM

## 2024-02-12 ENCOUNTER — OFFICE VISIT (OUTPATIENT)
Dept: CARDIOLOGY | Facility: CLINIC | Age: 64
End: 2024-02-12
Payer: COMMERCIAL

## 2024-02-12 VITALS
HEIGHT: 66 IN | DIASTOLIC BLOOD PRESSURE: 68 MMHG | WEIGHT: 190 LBS | OXYGEN SATURATION: 99 % | HEART RATE: 88 BPM | BODY MASS INDEX: 30.53 KG/M2 | SYSTOLIC BLOOD PRESSURE: 108 MMHG

## 2024-02-12 DIAGNOSIS — J44.9 CHRONIC OBSTRUCTIVE PULMONARY DISEASE, UNSPECIFIED COPD TYPE: ICD-10-CM

## 2024-02-12 DIAGNOSIS — G47.36 SLEEP-RELATED HYPOVENTILATION DUE TO MEDICAL CONDITION: ICD-10-CM

## 2024-02-12 DIAGNOSIS — G47.33 OBSTRUCTIVE SLEEP APNEA SYNDROME: Primary | ICD-10-CM

## 2024-02-12 PROCEDURE — 1160F RVW MEDS BY RX/DR IN RCRD: CPT | Performed by: NURSE PRACTITIONER

## 2024-02-12 PROCEDURE — 1159F MED LIST DOCD IN RCRD: CPT | Performed by: NURSE PRACTITIONER

## 2024-02-12 PROCEDURE — 99214 OFFICE O/P EST MOD 30 MIN: CPT | Performed by: NURSE PRACTITIONER

## 2024-02-12 RX ORDER — ASPIRIN 81 MG/1
81 TABLET ORAL DAILY
COMMUNITY

## 2024-02-12 RX ORDER — OXYCODONE HYDROCHLORIDE AND ACETAMINOPHEN 5; 325 MG/1; MG/1
1 TABLET ORAL 3 TIMES DAILY
COMMUNITY
Start: 2024-01-27

## 2024-03-06 ENCOUNTER — TELEPHONE (OUTPATIENT)
Dept: CARDIOLOGY | Facility: CLINIC | Age: 64
End: 2024-03-06
Payer: COMMERCIAL

## 2024-03-06 DIAGNOSIS — G47.33 OBSTRUCTIVE SLEEP APNEA SYNDROME: Primary | ICD-10-CM

## 2024-03-06 NOTE — TELEPHONE ENCOUNTER
Destinee from [facility] called states she has two orders for BIPAP settings for this patients new BIPAP setup please fax the correct order to [phone] she is working in the  [place] office today on this patient.please send attn Destinee in respiratory,. Thanks

## 2024-03-06 NOTE — TELEPHONE ENCOUNTER
I have reviewed the DL. Thanks for getting her connected and able to view. I  have faxed Sue pressure adjustments for her BIPAP. I spoke to Donaldo the RT and he is working on this now.

## 2024-03-20 ENCOUNTER — TELEPHONE (OUTPATIENT)
Dept: CARDIOLOGY | Facility: CLINIC | Age: 64
End: 2024-03-20

## 2024-03-20 NOTE — TELEPHONE ENCOUNTER
Caller: Lexii Duckworth    Relationship to patient: Self    Best call back number: [phone]    Patient is needing: ORDERS TO HAVE CPAP MACHINE ADJUSTED. PLEASE ADVISE THANK YOU

## 2024-04-12 ENCOUNTER — TELEPHONE (OUTPATIENT)
Dept: CARDIOLOGY | Facility: CLINIC | Age: 64
End: 2024-04-12

## 2024-04-12 NOTE — TELEPHONE ENCOUNTER
Spoke with patient's daughter, April, who is listed as emergency contact on patient's chart. Went over Lizbeth's note with her that Lizbeth advised that if patient is continuing to have symptoms or symptoms return to go to the ER or see PCP. Offered to move up appt for her to see patient sooner than 4/29/24 but she refused. Told her again if symptoms return she needs to take patient to ER or to see PCP, she verbalized understanding.

## 2024-04-12 NOTE — TELEPHONE ENCOUNTER
"  Caller: April Duckworth ON [facility], NO BOXES CHECKED. NO INFO GIVEN     Relationship: Emergency Contact    Best call back number: [phone]     What is the best time to reach you: ANYTIME     What was the call regarding:   STARTED BIPAP, THIS MAKES HER FEEL \"FUNNY\" AND \"DRUNK\". PT WHEN TO GO TO THE BATHROOM AND FELL INTO THE WALL. COULD NOT SEE. VISION BLURRY. WOKE UP WITH HEADACHE     UNSURE IF ACTIVE NOW, PT HAS STOPPED USING THE BIPAP THE THE DAY AFTER SHE STARTED THIS.PLEASE ADVISE.     Is it okay if the provider responds through MyChart: CALL   "

## 2024-04-29 ENCOUNTER — TELEPHONE (OUTPATIENT)
Dept: CARDIOLOGY | Facility: CLINIC | Age: 64
End: 2024-04-29
Payer: COMMERCIAL

## 2024-04-29 NOTE — TELEPHONE ENCOUNTER
Please call the patient and have her come on in for an appointment. She has not tolerated the BIPAP and it is best that I see her in person. See if her helpful daughter Windy will come with her. I know that she is not using the BIPAP, but I want to help her with this.

## 2024-04-29 NOTE — TELEPHONE ENCOUNTER
Spoke to patient on Friday 4/26/24, to confirm appointment, she stated that she did not think she needed the appointment for 4/29/24, the appointment was cancelled by text on Monday morning.